# Patient Record
Sex: FEMALE | Race: WHITE | Employment: FULL TIME | URBAN - METROPOLITAN AREA
[De-identification: names, ages, dates, MRNs, and addresses within clinical notes are randomized per-mention and may not be internally consistent; named-entity substitution may affect disease eponyms.]

---

## 2017-01-04 ENCOUNTER — CLINICAL SUPPORT (OUTPATIENT)
Dept: CARDIOLOGY CLINIC | Age: 47
End: 2017-01-04

## 2017-01-04 DIAGNOSIS — Z79.01 LONG TERM (CURRENT) USE OF ANTICOAGULANTS: ICD-10-CM

## 2017-01-04 DIAGNOSIS — Z95.2 S/P AVR (AORTIC VALVE REPLACEMENT) AND AORTOPLASTY: Primary | ICD-10-CM

## 2017-01-06 ENCOUNTER — TELEPHONE (OUTPATIENT)
Dept: CARDIOLOGY CLINIC | Age: 47
End: 2017-01-06

## 2017-01-06 NOTE — TELEPHONE ENCOUNTER
IMAN for [facility] Records. Ms Warden Lara doesn't have [address] anymore. She has [employer]. Asked her to call me if she has any further questions.

## 2017-01-06 NOTE — TELEPHONE ENCOUNTER
The Nurse , Tamiko Pollock called from [place] [address], [phone]. She would like to discuss the patient's PTINR and medication intake. She would like to discuss the at home testing monitor. Thanks!

## 2017-01-12 ENCOUNTER — TELEPHONE (OUTPATIENT)
Dept: CARDIOLOGY CLINIC | Age: 47
End: 2017-01-12

## 2017-01-12 NOTE — TELEPHONE ENCOUNTER
Called patient. Verified patient's identity with two identifiers. Patient states she has felt some fatigue. She states it started after shoveling snow and while shoveling snow, she felt SOB. She states she thinks it could just be because she is out of shape. She denies current CP or sxs. She states her BP is 106/74 and HR 74. She asked if that was normal. I told her it was. I advised she call back if sxs continue/re-occur or worsen. Discussed signs and symptoms qualifying urgent care or call to office. Patient verbalizes understanding and denies further questions or concerns.

## 2017-01-13 ENCOUNTER — APPOINTMENT (OUTPATIENT)
Dept: GENERAL RADIOLOGY | Age: 47
DRG: 312 | End: 2017-01-13
Attending: EMERGENCY MEDICINE
Payer: COMMERCIAL

## 2017-01-13 ENCOUNTER — APPOINTMENT (OUTPATIENT)
Dept: CT IMAGING | Age: 47
DRG: 312 | End: 2017-01-13
Attending: EMERGENCY MEDICINE
Payer: COMMERCIAL

## 2017-01-13 ENCOUNTER — HOSPITAL ENCOUNTER (INPATIENT)
Age: 47
LOS: 3 days | Discharge: HOME | DRG: 312 | End: 2017-01-16
Attending: EMERGENCY MEDICINE | Admitting: FAMILY MEDICINE
Payer: COMMERCIAL

## 2017-01-13 DIAGNOSIS — R65.10 SIRS (SYSTEMIC INFLAMMATORY RESPONSE SYNDROME) (HCC): ICD-10-CM

## 2017-01-13 DIAGNOSIS — I95.89 OTHER SPECIFIED HYPOTENSION: ICD-10-CM

## 2017-01-13 DIAGNOSIS — R55 SYNCOPE AND COLLAPSE: Primary | ICD-10-CM

## 2017-01-13 PROBLEM — R41.82 ALTERED MENTAL STATUS: Status: ACTIVE | Noted: 2017-01-13

## 2017-01-13 LAB
ALBUMIN SERPL BCP-MCNC: 3.4 G/DL (ref 3.5–5)
ALBUMIN/GLOB SERPL: 0.9 {RATIO} (ref 1.1–2.2)
ALP SERPL-CCNC: 77 U/L (ref 45–117)
ALT SERPL-CCNC: 22 U/L (ref 12–78)
ANION GAP BLD CALC-SCNC: 8 MMOL/L (ref 5–15)
APPEARANCE UR: CLEAR
AST SERPL W P-5'-P-CCNC: 19 U/L (ref 15–37)
BACTERIA URNS QL MICRO: NEGATIVE /HPF
BASOPHILS # BLD AUTO: 0 K/UL (ref 0–0.1)
BASOPHILS # BLD: 0 % (ref 0–1)
BILIRUB SERPL-MCNC: 0.3 MG/DL (ref 0.2–1)
BILIRUB UR QL: NEGATIVE
BUN SERPL-MCNC: 12 MG/DL (ref 6–20)
BUN/CREAT SERPL: 14 (ref 12–20)
CALCIUM SERPL-MCNC: 8.1 MG/DL (ref 8.5–10.1)
CHLORIDE SERPL-SCNC: 101 MMOL/L (ref 97–108)
CK SERPL-CCNC: 71 U/L (ref 26–192)
CO2 SERPL-SCNC: 25 MMOL/L (ref 21–32)
COLOR UR: ABNORMAL
CREAT SERPL-MCNC: 0.85 MG/DL (ref 0.55–1.02)
EOSINOPHIL # BLD: 0.2 K/UL (ref 0–0.4)
EOSINOPHIL NFR BLD: 1 % (ref 0–7)
EPITH CASTS URNS QL MICRO: ABNORMAL /LPF
ERYTHROCYTE [DISTWIDTH] IN BLOOD BY AUTOMATED COUNT: 13.8 % (ref 11.5–14.5)
GLOBULIN SER CALC-MCNC: 3.7 G/DL (ref 2–4)
GLUCOSE SERPL-MCNC: 149 MG/DL (ref 65–100)
GLUCOSE UR STRIP.AUTO-MCNC: NEGATIVE MG/DL
HCT VFR BLD AUTO: 39.8 % (ref 35–47)
HGB BLD-MCNC: 13.6 G/DL (ref 11.5–16)
HGB UR QL STRIP: ABNORMAL
HYALINE CASTS URNS QL MICRO: ABNORMAL /LPF (ref 0–5)
INR PPP: 2.2 (ref 0.9–1.1)
KETONES UR QL STRIP.AUTO: NEGATIVE MG/DL
LACTATE SERPL-SCNC: 2.3 MMOL/L (ref 0.4–2)
LEUKOCYTE ESTERASE UR QL STRIP.AUTO: NEGATIVE
LYMPHOCYTES # BLD AUTO: 13 % (ref 12–49)
LYMPHOCYTES # BLD: 2.9 K/UL (ref 0.8–3.5)
MAGNESIUM SERPL-MCNC: 1.3 MG/DL (ref 1.6–2.4)
MCH RBC QN AUTO: 30.4 PG (ref 26–34)
MCHC RBC AUTO-ENTMCNC: 34.2 G/DL (ref 30–36.5)
MCV RBC AUTO: 88.8 FL (ref 80–99)
MONOCYTES # BLD: 1.2 K/UL (ref 0–1)
MONOCYTES NFR BLD AUTO: 6 % (ref 5–13)
NEUTS SEG # BLD: 17.7 K/UL (ref 1.8–8)
NEUTS SEG NFR BLD AUTO: 80 % (ref 32–75)
NITRITE UR QL STRIP.AUTO: NEGATIVE
PH UR STRIP: 5.5 [PH] (ref 5–8)
PLATELET # BLD AUTO: 261 K/UL (ref 150–400)
POTASSIUM SERPL-SCNC: 3.5 MMOL/L (ref 3.5–5.1)
PROT SERPL-MCNC: 7.1 G/DL (ref 6.4–8.2)
PROT UR STRIP-MCNC: NEGATIVE MG/DL
PROTHROMBIN TIME: 22.6 SEC (ref 9–11.1)
RBC # BLD AUTO: 4.48 M/UL (ref 3.8–5.2)
RBC #/AREA URNS HPF: ABNORMAL /HPF (ref 0–5)
SODIUM SERPL-SCNC: 134 MMOL/L (ref 136–145)
SP GR UR REFRACTOMETRY: 1.02 (ref 1–1.03)
TROPONIN I SERPL-MCNC: <0.04 NG/ML
UA: UC IF INDICATED,UAUC: ABNORMAL
UROBILINOGEN UR QL STRIP.AUTO: 1 EU/DL (ref 0.2–1)
WBC # BLD AUTO: 22 K/UL (ref 3.6–11)
WBC URNS QL MICRO: ABNORMAL /HPF (ref 0–4)

## 2017-01-13 PROCEDURE — 65660000000 HC RM CCU STEPDOWN

## 2017-01-13 PROCEDURE — 83605 ASSAY OF LACTIC ACID: CPT | Performed by: EMERGENCY MEDICINE

## 2017-01-13 PROCEDURE — 81001 URINALYSIS AUTO W/SCOPE: CPT | Performed by: EMERGENCY MEDICINE

## 2017-01-13 PROCEDURE — 93005 ELECTROCARDIOGRAM TRACING: CPT

## 2017-01-13 PROCEDURE — 71020 XR CHEST PA LAT: CPT

## 2017-01-13 PROCEDURE — 96361 HYDRATE IV INFUSION ADD-ON: CPT

## 2017-01-13 PROCEDURE — 85025 COMPLETE CBC W/AUTO DIFF WBC: CPT | Performed by: EMERGENCY MEDICINE

## 2017-01-13 PROCEDURE — 84484 ASSAY OF TROPONIN QUANT: CPT | Performed by: EMERGENCY MEDICINE

## 2017-01-13 PROCEDURE — 99285 EMERGENCY DEPT VISIT HI MDM: CPT

## 2017-01-13 PROCEDURE — 85610 PROTHROMBIN TIME: CPT | Performed by: EMERGENCY MEDICINE

## 2017-01-13 PROCEDURE — 80053 COMPREHEN METABOLIC PANEL: CPT | Performed by: EMERGENCY MEDICINE

## 2017-01-13 PROCEDURE — 36415 COLL VENOUS BLD VENIPUNCTURE: CPT | Performed by: EMERGENCY MEDICINE

## 2017-01-13 PROCEDURE — 94761 N-INVAS EAR/PLS OXIMETRY MLT: CPT

## 2017-01-13 PROCEDURE — 82550 ASSAY OF CK (CPK): CPT | Performed by: EMERGENCY MEDICINE

## 2017-01-13 PROCEDURE — 74011000258 HC RX REV CODE- 258: Performed by: EMERGENCY MEDICINE

## 2017-01-13 PROCEDURE — 96365 THER/PROPH/DIAG IV INF INIT: CPT

## 2017-01-13 PROCEDURE — 80307 DRUG TEST PRSMV CHEM ANLYZR: CPT | Performed by: FAMILY MEDICINE

## 2017-01-13 PROCEDURE — 96375 TX/PRO/DX INJ NEW DRUG ADDON: CPT

## 2017-01-13 PROCEDURE — 74011250636 HC RX REV CODE- 250/636: Performed by: EMERGENCY MEDICINE

## 2017-01-13 PROCEDURE — 70450 CT HEAD/BRAIN W/O DYE: CPT

## 2017-01-13 PROCEDURE — 83735 ASSAY OF MAGNESIUM: CPT | Performed by: EMERGENCY MEDICINE

## 2017-01-13 PROCEDURE — 87040 BLOOD CULTURE FOR BACTERIA: CPT | Performed by: EMERGENCY MEDICINE

## 2017-01-13 RX ORDER — WARFARIN 1 MG/1
3 TABLET ORAL
COMMUNITY
End: 2017-01-26

## 2017-01-13 RX ORDER — DOCUSATE SODIUM 100 MG/1
100 CAPSULE, LIQUID FILLED ORAL EVERY OTHER DAY
COMMUNITY
End: 2017-04-01

## 2017-01-13 RX ORDER — WARFARIN 1 MG/1
4 TABLET ORAL
COMMUNITY
End: 2017-03-22 | Stop reason: SDUPTHER

## 2017-01-13 RX ORDER — MAGNESIUM SULFATE HEPTAHYDRATE 40 MG/ML
2 INJECTION, SOLUTION INTRAVENOUS
Status: COMPLETED | OUTPATIENT
Start: 2017-01-13 | End: 2017-01-13

## 2017-01-13 RX ORDER — THERA TABS 400 MCG
1 TAB ORAL DAILY
COMMUNITY
End: 2017-11-14

## 2017-01-13 RX ORDER — VANCOMYCIN 1.75 GRAM/500 ML IN 0.9 % SODIUM CHLORIDE INTRAVENOUS
25 ONCE
Status: COMPLETED | OUTPATIENT
Start: 2017-01-13 | End: 2017-01-14

## 2017-01-13 RX ADMIN — PIPERACILLIN SODIUM,TAZOBACTAM SODIUM 3.38 G: 3; .375 INJECTION, POWDER, FOR SOLUTION INTRAVENOUS at 22:06

## 2017-01-13 RX ADMIN — SODIUM CHLORIDE 1000 ML: 900 INJECTION, SOLUTION INTRAVENOUS at 20:23

## 2017-01-13 RX ADMIN — SODIUM CHLORIDE 1000 ML: 900 INJECTION, SOLUTION INTRAVENOUS at 21:34

## 2017-01-13 RX ADMIN — MAGNESIUM SULFATE HEPTAHYDRATE 2 G: 40 INJECTION, SOLUTION INTRAVENOUS at 22:53

## 2017-01-13 RX ADMIN — VANCOMYCIN HYDROCHLORIDE 1750 MG: 10 INJECTION, POWDER, LYOPHILIZED, FOR SOLUTION INTRAVENOUS at 23:49

## 2017-01-13 NOTE — IP AVS SNAPSHOT
[address] 
[phone] Patient: Misti Montalvo MRN: [id number] REP:1/2/4588 You are allergic to the following Allergen Reactions Contrast Dye (Iodine) Anaphylaxis Lipitor (Atorvastatin) Myalgia And GI upset Percocet (Oxycodone-Acetaminophen) Other (comments) Dizziness/fall Sulfa (Sulfonamide Antibiotics) Other (comments) Gi upset Tramadol Rash Vicoprofen (Hydrocodone-Ibuprofen) Nausea and Vomiting Can take hydrocodone and ibuprofen separately, but not together Immunizations Administered for This Admission Name Date Influenza Vaccine (Quad) PF 1/16/2017 Recent Documentation Height Weight BMI OB Status Smoking Status 1.613 m 72.8 kg 27.98 kg/m2 Hysterectomy Current Every Day Smoker Unresulted Labs Order Current Status CULTURE, BLOOD, PAIRED Preliminary result Emergency Contacts Name Discharge Info Relation Home Work Mobile [address] CAREGIVER [3] Parent [1] [phone] About your hospitalization You were admitted on:  January 13, 2017 You last received care in the:  [facility] CV SERVICES UNIT You were discharged on:  January 16, 2017 Unit phone number:  [phone] Why you were hospitalized Your primary diagnosis was: Altered Mental Status Your diagnoses also included:  Syncope, Sirs (Systemic Inflammatory Response Syndrome) (Hcc) Providers Seen During Your Hospitalizations Provider Role Specialty Primary office phone Janay Mittla MD Attending Provider Emergency Medicine [phone] Myla Monique MD Attending Provider [facility] [phone] Alfred March MD Attending Provider Internal Medicine [phone] Hanna Holly MD Attending Provider Internal Medicine [phone] Your Primary Care Physician (PCP) Primary Care Physician Office Phone Office Fax Nicci Martinez [phone] ** None ** Follow-up Information Follow up With Details Comments Contact Info [facility] [address], MD In 1 week  [address] 
[phone] Raimundo Edward MD On 1/23/2017 11:00 AM [address] [place] 
[phone] Hanna Segovia MD On 2/2/2017 9:00 AM [address] [place] 
[phone] Your Appointments Monday January 23, 2017 11:00 AM EST HOSPITAL DISCHARGE with Raimundo Edward MD  
[facility] ([facility]) [address] [place]  
[phone] Thursday February 02, 2017  9:00 AM EST New Patient with Hanna Segovia MD  
[facility] ([facility]) [address] [place]  
[phone] Friday February 03, 2017  3:40 PM EST  
COUMADIN CLINIC with EFREM JULIEN [facility] ([facility] SCHEDULING) [address] [place]  
[phone] Thursday March 02, 2017  8:40 AM EST  
ESTABLISHED PATIENT with Raimundo Edward MD  
[facility] ([facility]) [address] [place]  
[phone] Current Discharge Medication List  
  
CONTINUE these medications which have CHANGED Dose & Instructions Dispensing Information Comments Morning Noon Evening Bedtime * warfarin 1 mg tablet Commonly known as:  COUMADIN What changed:  Another medication with the same name was removed. Continue taking this medication, and follow the directions you see here. Your next dose is: Today, Tomorrow Other:  _________ Dose:  4 mg Take 4 mg by mouth five (5) days a week. Every day except Wednesday and Saturday Refills:  0  
     
   
   
   
  
 * warfarin 1 mg tablet Commonly known as:  COUMADIN What changed:  Another medication with the same name was removed. Continue taking this medication, and follow the directions you see here. Your next dose is: Today, Tomorrow Other:  _________ Dose:  3 mg Take 3 mg by mouth two (2) days a week. On Wednesday and Saturday Refills:  0  
     
   
   
   
  
 * Notice: This list has 2 medication(s) that are the same as other medications prescribed for you. Read the directions carefully, and ask your doctor or other care provider to review them with you. CONTINUE these medications which have NOT CHANGED Dose & Instructions Dispensing Information Comments Morning Noon Evening Bedtime  
 aspirin 81 mg chewable tablet Your next dose is: Today, Tomorrow Other:  _________ Dose:  81 mg Take 1 Tab by mouth daily. Quantity:  30 Tab Refills:  1  
     
   
   
   
  
 docusate sodium 100 mg capsule Commonly known as:  Feliciano Pata Your next dose is: Today, Tomorrow Other:  _________ Dose:  100 mg Take 100 mg by mouth every other day. Refills:  0  
     
   
   
   
  
 eletriptan 40 mg tablet Commonly known as:  RELPAX Your next dose is: Today, Tomorrow Other:  _________ Dose:  40 mg Take 1 Tab by mouth once as needed for up to 1 dose. may repeat in 2 hours if necessary Quantity:  6 Tab Refills:  3 FLUoxetine 40 mg capsule Commonly known as:  PROzac Your next dose is: Today, Tomorrow Other:  _________ TAKE ONE CAPSULE BY MOUTH DAILY Quantity:  30 Cap Refills:  1 MIRALAX 17 gram/dose powder Generic drug:  polyethylene glycol Your next dose is: Today, Tomorrow Other:  _________ Dose:  17 g Take 17 g by mouth as needed (for constipation). Refills:  0 pravastatin 40 mg tablet Commonly known as:  PRAVACHOL Your next dose is: Today, Tomorrow Other:  _________ Dose:  40 mg Take 40 mg by mouth nightly. Refills:  0  
     
   
   
   
  
 therapeutic multivitamin tablet Commonly known as:  [facility] Your next dose is: Today, Tomorrow Other:  _________ Dose:  1 Tab Take 1 Tab by mouth daily. Refills:  0 ZyrTEC 10 mg tablet Generic drug:  cetirizine Your next dose is: Today, Tomorrow Other:  _________ Dose:  10 mg Take 10 mg by mouth as needed. Refills:  0 STOP taking these medications MULTIVITAMIN PO Discharge Instructions Discharge Instructions PATIENT ID: Araceli Brennan MRN: [id number] YOB: 1970 DATE OF ADMISSION: 1/13/2017  8:05 PM   
DATE OF DISCHARGE: 1/16/2017 PRIMARY CARE PROVIDER: Opal Simpson MD  
 
ATTENDING PHYSICIAN: Paul Gonzales MD 
DISCHARGING PROVIDER: Paul Gonzales MD   
To contact this individual call [phone] and ask the  to page. If unavailable ask to be transferred the Adult Hospitalist Department. DISCHARGE DIAGNOSES Syncope CONSULTATIONS: IP CONSULT TO HOSPITALIST 
IP CONSULT TO CARDIOLOGY 
IP CONSULT TO INFECTIOUS DISEASES PROCEDURES/SURGERIES: * No surgery found * PENDING TEST RESULTS:  
At the time of discharge the following test results are still pending: FOLLOW UP APPOINTMENTS:  
Follow-up Information Follow up With Details Comments Contact Info Opal Simpson MD In 1 week  [address] 
[phone] Nabor Cohen MD In 2 weeks  [place] [address] 
[phone] ADDITIONAL CARE RECOMMENDATIONS: follow up with cardiology for further w/u DIET: Cardiac Diet ACTIVITY: Activity as tolerated WOUND CARE:  
 
EQUIPMENT needed:  
 
 
DISCHARGE MEDICATIONS: 
 See Medication Reconciliation Form · It is important that you take the medication exactly as they are prescribed. · Keep your medication in the bottles provided by the pharmacist and keep a list of the medication names, dosages, and times to be taken in your wallet. · Do not take other medications without consulting your doctor. NOTIFY YOUR PHYSICIAN FOR ANY OF THE FOLLOWING:  
Fever over 101 degrees for 24 hours. Chest pain, shortness of breath, fever, chills, nausea, vomiting, diarrhea, change in mentation, falling, weakness, bleeding. Severe pain or pain not relieved by medications. Or, any other signs or symptoms that you may have questions about. DISPOSITION: 
x  Home With: 
 OT  PT  [facility]  RN  
  
 SNF/Inpatient Rehab/LTAC Independent/assisted living Hospice Other: CDMP Checked:  
Yes x PROBLEM LIST Updated: 
Yes x Signed:  
Robert Stafford MD 
1/16/2017 
12:10 PM 
 
Discharge Orders None [place] Announcement We are excited to announce that we are making your provider's discharge notes available to you in Rico. You will see these notes when they are completed and signed by the physician that discharged you from your recent hospital stay. If you have any questions or concerns about any information you see in Rico, please call the Health Information Department where you were seen or reach out to your Primary Care Provider for more information about your plan of care. Introducing \[facility]\"" & HEALTH SERVICES! Dear Antonia Davidson: 
Thank you for requesting a Rico account. Our records indicate that you have previously registered for a Rico account but its currently inactive. Please call our [place] support line at [phone]. Additional Information If you have questions, please visit the Frequently Asked Questions section of the [employer] website at [email or web address] Remember, MyChart is NOT to be used for urgent needs. For medical emergencies, dial 911. Now available from your iPhone and Android! General Information Please provide this summary of care documentation to your next provider. Patient Signature:  ____________________________________________________________ Date:  ____________________________________________________________  
  
[facility] Provider Signature:  ____________________________________________________________ Date:  ____________________________________________________________

## 2017-01-14 LAB
AMMONIA PLAS-SCNC: 28 UMOL/L
AMPHET UR QL SCN: NEGATIVE
ANION GAP BLD CALC-SCNC: 6 MMOL/L (ref 5–15)
APAP SERPL-MCNC: <2 UG/ML (ref 10–30)
APTT PPP: 37.9 SEC (ref 22.1–32.5)
ATRIAL RATE: 80 BPM
BARBITURATES UR QL SCN: NEGATIVE
BASOPHILS # BLD AUTO: 0 K/UL (ref 0–0.1)
BASOPHILS # BLD: 0 % (ref 0–1)
BENZODIAZ UR QL: NEGATIVE
BUN SERPL-MCNC: 9 MG/DL (ref 6–20)
BUN/CREAT SERPL: 16 (ref 12–20)
CALCIUM SERPL-MCNC: 7.5 MG/DL (ref 8.5–10.1)
CALCULATED P AXIS, ECG09: 44 DEGREES
CALCULATED R AXIS, ECG10: 61 DEGREES
CALCULATED T AXIS, ECG11: 81 DEGREES
CANNABINOIDS UR QL SCN: POSITIVE
CHLORIDE SERPL-SCNC: 114 MMOL/L (ref 97–108)
CO2 SERPL-SCNC: 22 MMOL/L (ref 21–32)
COCAINE UR QL SCN: NEGATIVE
CREAT SERPL-MCNC: 0.56 MG/DL (ref 0.55–1.02)
DIAGNOSIS, 93000: NORMAL
DRUG SCRN COMMENT,DRGCM: ABNORMAL
EOSINOPHIL # BLD: 0.2 K/UL (ref 0–0.4)
EOSINOPHIL NFR BLD: 1 % (ref 0–7)
ERYTHROCYTE [DISTWIDTH] IN BLOOD BY AUTOMATED COUNT: 13.9 % (ref 11.5–14.5)
ETHANOL SERPL-MCNC: <10 MG/DL
GLUCOSE SERPL-MCNC: 94 MG/DL (ref 65–100)
HCT VFR BLD AUTO: 34.3 % (ref 35–47)
HGB BLD-MCNC: 11.6 G/DL (ref 11.5–16)
INR PPP: 2 (ref 0.9–1.1)
LACTATE SERPL-SCNC: 0.9 MMOL/L (ref 0.4–2)
LYMPHOCYTES # BLD AUTO: 25 % (ref 12–49)
LYMPHOCYTES # BLD: 3.6 K/UL (ref 0.8–3.5)
MAGNESIUM SERPL-MCNC: 2.1 MG/DL (ref 1.6–2.4)
MCH RBC QN AUTO: 30 PG (ref 26–34)
MCHC RBC AUTO-ENTMCNC: 33.8 G/DL (ref 30–36.5)
MCV RBC AUTO: 88.6 FL (ref 80–99)
METHADONE UR QL: NEGATIVE
MONOCYTES # BLD: 0.6 K/UL (ref 0–1)
MONOCYTES NFR BLD AUTO: 5 % (ref 5–13)
NEUTS SEG # BLD: 9.8 K/UL (ref 1.8–8)
NEUTS SEG NFR BLD AUTO: 69 % (ref 32–75)
OPIATES UR QL: NEGATIVE
P-R INTERVAL, ECG05: 146 MS
PCP UR QL: NEGATIVE
PHOSPHATE SERPL-MCNC: 3.1 MG/DL (ref 2.6–4.7)
PLATELET # BLD AUTO: 229 K/UL (ref 150–400)
POTASSIUM SERPL-SCNC: 4.2 MMOL/L (ref 3.5–5.1)
PROTHROMBIN TIME: 20.3 SEC (ref 9–11.1)
Q-T INTERVAL, ECG07: 482 MS
QRS DURATION, ECG06: 74 MS
QTC CALCULATION (BEZET), ECG08: 555 MS
RBC # BLD AUTO: 3.87 M/UL (ref 3.8–5.2)
SALICYLATES SERPL-MCNC: 3 MG/DL (ref 2.8–20)
SODIUM SERPL-SCNC: 142 MMOL/L (ref 136–145)
T4 FREE SERPL-MCNC: 1 NG/DL (ref 0.8–1.5)
THERAPEUTIC RANGE,PTTT: ABNORMAL SECS (ref 58–77)
TROPONIN I SERPL-MCNC: <0.04 NG/ML
TSH SERPL DL<=0.05 MIU/L-ACNC: 0.48 UIU/ML (ref 0.36–3.74)
VENTRICULAR RATE, ECG03: 80 BPM
WBC # BLD AUTO: 14.2 K/UL (ref 3.6–11)

## 2017-01-14 PROCEDURE — 84484 ASSAY OF TROPONIN QUANT: CPT | Performed by: FAMILY MEDICINE

## 2017-01-14 PROCEDURE — 84439 ASSAY OF FREE THYROXINE: CPT | Performed by: FAMILY MEDICINE

## 2017-01-14 PROCEDURE — 85730 THROMBOPLASTIN TIME PARTIAL: CPT | Performed by: FAMILY MEDICINE

## 2017-01-14 PROCEDURE — 80048 BASIC METABOLIC PNL TOTAL CA: CPT | Performed by: FAMILY MEDICINE

## 2017-01-14 PROCEDURE — 65660000000 HC RM CCU STEPDOWN

## 2017-01-14 PROCEDURE — 83735 ASSAY OF MAGNESIUM: CPT | Performed by: FAMILY MEDICINE

## 2017-01-14 PROCEDURE — 85610 PROTHROMBIN TIME: CPT | Performed by: FAMILY MEDICINE

## 2017-01-14 PROCEDURE — 74011250636 HC RX REV CODE- 250/636: Performed by: INTERNAL MEDICINE

## 2017-01-14 PROCEDURE — 80307 DRUG TEST PRSMV CHEM ANLYZR: CPT | Performed by: FAMILY MEDICINE

## 2017-01-14 PROCEDURE — 93306 TTE W/DOPPLER COMPLETE: CPT

## 2017-01-14 PROCEDURE — 97161 PT EVAL LOW COMPLEX 20 MIN: CPT

## 2017-01-14 PROCEDURE — 74011250636 HC RX REV CODE- 250/636: Performed by: FAMILY MEDICINE

## 2017-01-14 PROCEDURE — 74011000258 HC RX REV CODE- 258: Performed by: FAMILY MEDICINE

## 2017-01-14 PROCEDURE — 84443 ASSAY THYROID STIM HORMONE: CPT | Performed by: FAMILY MEDICINE

## 2017-01-14 PROCEDURE — 85025 COMPLETE CBC W/AUTO DIFF WBC: CPT | Performed by: FAMILY MEDICINE

## 2017-01-14 PROCEDURE — 74011000258 HC RX REV CODE- 258: Performed by: INTERNAL MEDICINE

## 2017-01-14 PROCEDURE — 84100 ASSAY OF PHOSPHORUS: CPT | Performed by: FAMILY MEDICINE

## 2017-01-14 PROCEDURE — 97116 GAIT TRAINING THERAPY: CPT

## 2017-01-14 PROCEDURE — 36415 COLL VENOUS BLD VENIPUNCTURE: CPT | Performed by: FAMILY MEDICINE

## 2017-01-14 PROCEDURE — 83605 ASSAY OF LACTIC ACID: CPT | Performed by: FAMILY MEDICINE

## 2017-01-14 PROCEDURE — 74011250637 HC RX REV CODE- 250/637: Performed by: FAMILY MEDICINE

## 2017-01-14 PROCEDURE — 82140 ASSAY OF AMMONIA: CPT | Performed by: FAMILY MEDICINE

## 2017-01-14 RX ORDER — ACETAMINOPHEN 325 MG/1
650 TABLET ORAL
Status: DISCONTINUED | OUTPATIENT
Start: 2017-01-14 | End: 2017-01-16 | Stop reason: HOSPADM

## 2017-01-14 RX ORDER — POLYETHYLENE GLYCOL 3350 17 G/17G
17 POWDER, FOR SOLUTION ORAL DAILY PRN
Status: DISCONTINUED | OUTPATIENT
Start: 2017-01-14 | End: 2017-01-16 | Stop reason: HOSPADM

## 2017-01-14 RX ORDER — WARFARIN SODIUM 5 MG/1
5 TABLET ORAL ONCE
Status: COMPLETED | OUTPATIENT
Start: 2017-01-14 | End: 2017-01-14

## 2017-01-14 RX ORDER — SODIUM CHLORIDE 0.9 % (FLUSH) 0.9 %
5-10 SYRINGE (ML) INJECTION EVERY 8 HOURS
Status: DISCONTINUED | OUTPATIENT
Start: 2017-01-14 | End: 2017-01-16 | Stop reason: HOSPADM

## 2017-01-14 RX ORDER — SODIUM CHLORIDE 0.9 % (FLUSH) 0.9 %
5-10 SYRINGE (ML) INJECTION AS NEEDED
Status: DISCONTINUED | OUTPATIENT
Start: 2017-01-14 | End: 2017-01-16 | Stop reason: HOSPADM

## 2017-01-14 RX ORDER — GUAIFENESIN 100 MG/5ML
81 LIQUID (ML) ORAL DAILY
Status: DISCONTINUED | OUTPATIENT
Start: 2017-01-14 | End: 2017-01-16 | Stop reason: HOSPADM

## 2017-01-14 RX ORDER — FLUOXETINE HYDROCHLORIDE 20 MG/1
40 CAPSULE ORAL DAILY
Status: DISCONTINUED | OUTPATIENT
Start: 2017-01-14 | End: 2017-01-16 | Stop reason: HOSPADM

## 2017-01-14 RX ORDER — DOCUSATE SODIUM 100 MG/1
100 CAPSULE, LIQUID FILLED ORAL EVERY OTHER DAY
Status: DISCONTINUED | OUTPATIENT
Start: 2017-01-14 | End: 2017-01-16 | Stop reason: HOSPADM

## 2017-01-14 RX ORDER — SODIUM CHLORIDE 9 MG/ML
150 INJECTION, SOLUTION INTRAVENOUS CONTINUOUS
Status: DISCONTINUED | OUTPATIENT
Start: 2017-01-14 | End: 2017-01-15

## 2017-01-14 RX ORDER — THERA TABS 400 MCG
1 TAB ORAL DAILY
Status: DISCONTINUED | OUTPATIENT
Start: 2017-01-14 | End: 2017-01-16 | Stop reason: HOSPADM

## 2017-01-14 RX ORDER — PRAVASTATIN SODIUM 40 MG/1
40 TABLET ORAL
Status: DISCONTINUED | OUTPATIENT
Start: 2017-01-14 | End: 2017-01-16 | Stop reason: HOSPADM

## 2017-01-14 RX ORDER — CETIRIZINE HCL 10 MG
10 TABLET ORAL DAILY
Status: DISCONTINUED | OUTPATIENT
Start: 2017-01-14 | End: 2017-01-16 | Stop reason: HOSPADM

## 2017-01-14 RX ADMIN — VANCOMYCIN HYDROCHLORIDE 1000 MG: 1 INJECTION, POWDER, LYOPHILIZED, FOR SOLUTION INTRAVENOUS at 21:24

## 2017-01-14 RX ADMIN — Medication 10 ML: at 01:00

## 2017-01-14 RX ADMIN — ACETAMINOPHEN 650 MG: 325 TABLET, FILM COATED ORAL at 02:00

## 2017-01-14 RX ADMIN — FLUOXETINE 40 MG: 20 CAPSULE ORAL at 08:52

## 2017-01-14 RX ADMIN — WARFARIN SODIUM 5 MG: 5 TABLET ORAL at 18:33

## 2017-01-14 RX ADMIN — VANCOMYCIN HYDROCHLORIDE 1000 MG: 1 INJECTION, POWDER, LYOPHILIZED, FOR SOLUTION INTRAVENOUS at 12:15

## 2017-01-14 RX ADMIN — THERA TABS 1 TABLET: TAB at 08:53

## 2017-01-14 RX ADMIN — PRAVASTATIN SODIUM 40 MG: 40 TABLET ORAL at 02:00

## 2017-01-14 RX ADMIN — Medication 10 ML: at 21:24

## 2017-01-14 RX ADMIN — Medication 10 ML: at 14:00

## 2017-01-14 RX ADMIN — ASPIRIN 81 MG 81 MG: 81 TABLET ORAL at 08:53

## 2017-01-14 RX ADMIN — PIPERACILLIN SODIUM,TAZOBACTAM SODIUM 3.38 G: 3; .375 INJECTION, POWDER, FOR SOLUTION INTRAVENOUS at 15:32

## 2017-01-14 RX ADMIN — CETIRIZINE HYDROCHLORIDE 10 MG: 10 TABLET, FILM COATED ORAL at 08:52

## 2017-01-14 RX ADMIN — PIPERACILLIN SODIUM,TAZOBACTAM SODIUM 3.38 G: 3; .375 INJECTION, POWDER, FOR SOLUTION INTRAVENOUS at 06:59

## 2017-01-14 RX ADMIN — SODIUM CHLORIDE 150 ML/HR: 900 INJECTION, SOLUTION INTRAVENOUS at 02:20

## 2017-01-14 RX ADMIN — PRAVASTATIN SODIUM 40 MG: 40 TABLET ORAL at 21:24

## 2017-01-14 RX ADMIN — WARFARIN SODIUM 5 MG: 5 TABLET ORAL at 02:00

## 2017-01-14 RX ADMIN — Medication 10 ML: at 06:00

## 2017-01-14 NOTE — PROGRESS NOTES
Pharmacist Note  Warfarin Dosing  Consult provided for this 52 y. o.female to manage warfarin s/p mechanical aortic valve replacement in Aug '16    INR Goal: 2.5- 3.5 (confirmed with office notes)    Home regimen/ tablet size: 3 mg on Sun/Wed and 4 mg on M/T/Th/F/Sat    Drugs that may increase INR: None  Drugs that may decrease INR: None  Other current anticoagulants/ drugs that may increase bleeding risk: Aspirin, fluoxetine  Risk factors: None  Daily INR ordered: YES    Recent Labs      01/14/17   0419  01/13/17 2024   HGB  11.6  13.6   INR  2.0*  2.2*     Date               INR                  Dose  1/13  2.2  5 mg  1/14                 2                      5 mg                                                                                Assessment/ Plan: Will order warfarin 5 mg PO x 1 dose. May need to consider bridging with Lovenox until INR is therapeutic (goal 2.5 to 3.5 for mechanical aortic valve). Pharmacy will continue to monitor daily and adjust therapy as indicated. Please contact the pharmacist at  for outpatient recommendations if needed.

## 2017-01-14 NOTE — PROGRESS NOTES
1137: TRANSFER - IN REPORT:    Verbal report received from [address] (name) on Saranya Bourne  being received from ED (unit) for routine progression of care      Report consisted of patients Situation, Background, Assessment and   Recommendations(SBAR). Information from the following report(s) SBAR was reviewed with the receiving nurse. Opportunity for questions and clarification was provided. Assessment completed upon patients arrival to unit and care assumed. 0000: Bedside and Verbal shift change report given to [facility] [place] (oncoming nurse) by Charly Richmond (offgoing nurse). Report included the following information SBAR.

## 2017-01-14 NOTE — PROGRESS NOTES
Hospitalist Progress Note  Sumaya Zheng MD  Office: [phone]      Date of Service:  2017  NAME:  Blake Geiger  :  1970  MRN:  [id number]      Admission Summary:   Blake Geiger is a 52 y.o. white female with past medical history of anxiety, depression, ADD, GERD, hearing loss, hiatal hernia, hyperlipidemia, IBS, migraine headaches, mitral valve prolapse, neurotic eczema, pelvic pain, post menopausal syndrome, PTSD, TMJ, s/p CABG, s/p AVR presented to the ED via EMS from home with reported syncope. Patient reportedly had unwitnessed syncope event today. Patient reportedly was standing in the the kitchen, became suddenly dizzy, lightheaded, lost consciousness, and collapsed to the floor. Patient initially was uncertain if she had had hit her head per ED note. Patient notedly was confused after the syncopal episode. Interval history / Subjective:     Mental status better      Assessment & Plan:     1. Altered mental status. Admit to telemetry. Place on neurovascular checks, fall precautions. 2. Syncope. Plan as above. Order 2d echocardiogram, orthostatic vital signs. 3. SIRS (systemic inflammatory response syndrome). Rule out sepsis. - blood cx pen   - chest xray , ua all clear   - cont abx  - wbc trending down     6. MVP :  Long term anticoagulation on Warfarin. Pharmacy to dose Warfarin. 8. Tobacco abuse. Encourage smoking cessation. 9. Depression. On Prozac. Resume home medication. 10. Hyperlipidemia.  Continue Pravastatin.       Code status: Full   DVT prophylaxis: on coumadin     Care Plan discussed with: Patient/Family and Nurse  Disposition: TBD     Hospital Problems  Date Reviewed: 2016          Codes Class Noted POA    * (Principal)Altered mental status ICD-10-CM: R41.82  ICD-9-CM: 780.97  2017 Unknown        SIRS (systemic inflammatory response syndrome) (HCC) ICD-10-CM: R65.10  ICD-9-CM: 995.90  1/13/2017 Unknown        Syncope ICD-10-CM: R55  ICD-9-CM: 780.2  7/1/2016 Unknown                Review of Systems:   A comprehensive review of systems was negative except for that written in the HPI. Vital Signs:    Last 24hrs VS reviewed since prior progress note. Most recent are:  Visit Vitals    /48 (BP 1 Location: Right arm, BP Patient Position: At rest)    Pulse (!) 58    Temp 97.8 °F (36.6 °C)    Resp 18    Ht 5' 3.5\" (1.613 m)    Wt 70.5 kg (155 lb 6.8 oz)    SpO2 99%    BMI 27.1 kg/m2         Intake/Output Summary (Last 24 hours) at 01/14/17 0950  Last data filed at 01/14/17 8551   Gross per 24 hour   Intake              100 ml   Output             1000 ml   Net             -900 ml        Physical Examination:             Constitutional:  No acute distress, cooperative, pleasant    ENT:  Oral mucous moist, oropharynx benign. Neck supple,    Resp:  CTA bilaterally. No wheezing/rhonchi/rales. No accessory muscle use   CV:  Regular rhythm, normal rate, no murmurs, gallops, rubs    GI:  Soft, non distended, non tender. normoactive bowel sounds, no hepatosplenomegaly     Musculoskeletal:  No edema, warm, 2+ pulses throughout    Neurologic:  Moves all extremities.   AAOx3, CN II-XII reviewed          Data Review:    Review and/or order of clinical lab test  Review and/or order of tests in the radiology section of CPT  Review and/or order of tests in the medicine section of CPT      Labs:     Recent Labs      01/14/17 0419 01/13/17 2024   WBC  14.2*  22.0*   HGB  11.6  13.6   HCT  34.3*  39.8   PLT  229  261     Recent Labs      01/14/17 0419 01/13/17 2024   NA  142  134*   K  4.2  3.5   CL  114*  101   CO2  22  25   BUN  9  12   CREA  0.56  0.85   GLU  94  149*   CA  7.5*  8.1*   MG  2.1  1.3*   PHOS  3.1   --      Recent Labs      01/13/17 2024   SGOT  19   ALT  22   AP  77   TBILI  0.3   TP  7.1   ALB  3.4*   GLOB  3.7     Recent Labs      01/14/17 0419 01/13/17   2024 INR  2.0*  2.2*   PTP  20.3*  22.6*   APTT  37.9*   --       No results for input(s): FE, TIBC, PSAT, FERR in the last 72 hours. Lab Results   Component Value Date/Time    Folate 13.4 04/16/2010 09:48 AM      No results for input(s): PH, PCO2, PO2 in the last 72 hours.   Recent Labs      01/14/17   0419  01/13/17 2024   TROIQ  <0.04  <0.04     Lab Results   Component Value Date/Time    Cholesterol, total 204 07/26/2016 12:29 PM    HDL Cholesterol 50 07/26/2016 12:29 PM    LDL, calculated 141 07/26/2016 12:29 PM    Triglyceride 64 07/26/2016 12:29 PM     Lab Results   Component Value Date/Time    Glucose (POC) 95 08/20/2016 06:32 AM    Glucose (POC) 113 08/19/2016 05:33 PM    Glucose (POC) 122 08/19/2016 01:09 PM    Glucose (POC) 115 08/19/2016 12:06 PM    Glucose (POC) 114 08/19/2016 11:02 AM     Lab Results   Component Value Date/Time    Color YELLOW/STRAW 01/13/2017 10:45 PM    Appearance CLEAR 01/13/2017 10:45 PM    Specific gravity 1.016 01/13/2017 10:45 PM    pH (UA) 5.5 01/13/2017 10:45 PM    Protein NEGATIVE  01/13/2017 10:45 PM    Glucose NEGATIVE  01/13/2017 10:45 PM    Ketone NEGATIVE  01/13/2017 10:45 PM    Bilirubin NEGATIVE  01/13/2017 10:45 PM    Urobilinogen 1.0 01/13/2017 10:45 PM    Nitrites NEGATIVE  01/13/2017 10:45 PM    Leukocyte Esterase NEGATIVE  01/13/2017 10:45 PM    Epithelial cells FEW 01/13/2017 10:45 PM    Bacteria NEGATIVE  01/13/2017 10:45 PM    WBC 0-4 01/13/2017 10:45 PM    RBC 0-5 01/13/2017 10:45 PM         Medications Reviewed:     Current Facility-Administered Medications   Medication Dose Route Frequency    sodium chloride (NS) flush 5-10 mL  5-10 mL IntraVENous Q8H    sodium chloride (NS) flush 5-10 mL  5-10 mL IntraVENous PRN    aspirin chewable tablet 81 mg  81 mg Oral DAILY    cetirizine (ZYRTEC) tablet 10 mg  10 mg Oral DAILY    docusate sodium (COLACE) capsule 100 mg  100 mg Oral EVERY OTHER DAY    FLUoxetine (PROzac) capsule 40 mg  40 mg Oral DAILY    polyethylene glycol (MIRALAX) packet 17 g  17 g Oral DAILY PRN    pravastatin (PRAVACHOL) tablet 40 mg  40 mg Oral QHS    therapeutic multivitamin (THERAGRAN) tablet 1 Tab  1 Tab Oral DAILY    influenza vaccine 2016-17 (36mos+)(PF) (FLUZONE/FLUARIX/FLULAVAL QUAD) injection 0.5 mL  0.5 mL IntraMUSCular PRIOR TO DISCHARGE    acetaminophen (TYLENOL) tablet 650 mg  650 mg Oral Q6H PRN    0.9% sodium chloride infusion  150 mL/hr IntraVENous CONTINUOUS    vancomycin (VANCOCIN) 1,000 mg in 0.9% sodium chloride (MBP/ADV) 250 mL  1 g IntraVENous Q12H    VANCOMYCIN INFORMATION NOTE   Other PRN    WARFARIN INFORMATION NOTE (COUMADIN)   Other PRN    piperacillin-tazobactam (ZOSYN) 3.375 g in 0.9% sodium chloride (MBP/ADV) 100 mL  3.375 g IntraVENous Q8H    warfarin (COUMADIN) tablet 5 mg  5 mg Oral ONCE     ______________________________________________________________________  EXPECTED LENGTH OF STAY: - - -  ACTUAL LENGTH OF STAY:          1                 Marguerite Angelo MD

## 2017-01-14 NOTE — PROGRESS NOTES
Day #1 of Vancomycin  Indication:  SIRS, R/O sepsis  Current regimen:  Rx to Dose  Abx regimen:  Zosyn + Vancomycin  ID Following ?: NO  Concomitant nephrotoxic drugs (requires more frequent monitoring): None  Frequency of BMP?: Daily    Recent Labs      17   0419  17   WBC  14.2*  22.0*   CREA  0.56  0.85   BUN  9  12     Est CrCl: >100 ml/min  Temp (24hrs), Av.4 °F (36.9 °C), Min:97.8 °F (36.6 °C), Max:99.2 °F (37.3 °C)    Cultures:    Blood: NGTD    Goal trough = 15 - 20 mcg/mL    Recent trough history (date/time/level/dose/action taken):  None    Plan: The patient was given a vancomycin loading dose of 1750 mg IV x 1 on  @ 2349 with a maintenance dose of 1 gm IV Q 12 hr ordered. This was discontinued this morning and then subsequently re-ordered a few minutes later (I verified with Dr. Manpreet Feldman that she did want to restart it). Upon re-evaluation of the patient, it is noted that her Scr is now 0.56 (down from 0.85 yesterday evening). Therefore, I will restart the vancomycin at a dose of 1 gm IV Q 8 hr. Pharmacy will continue to monitor patient daily and will make dosage adjustments based upon changing renal function.

## 2017-01-14 NOTE — PROGRESS NOTES
0800 Bedside shift change report given to Jaymie Lou RN (oncoming nurse) by Sudha Holder RN (offgoing nurse). Report included the following information SBAR, Kardex, ED Summary, Procedure Summary, Intake/Output, MAR, Recent Results, Med Rec Status and Cardiac Rhythm Bradycardia. Problem: Falls - Risk of  Goal: *Absence of falls  Outcome: Progressing Towards Goal  Patient is free from falls. Problem: Pressure Ulcer - Risk of  Goal: *Prevention of pressure ulcer  Outcome: Progressing Towards Goal  Patient moves freely side to side. Problem: Syncope  Goal: Decrease or eliminate episodes of syncope  Outcome: Progressing Towards Goal  Patient denies any dizziness.
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
## 2017-01-14 NOTE — ED NOTES
TRANSFER - OUT REPORT:    Verbal report given to Nancy García RN on [employer]  being transferred to [facility] (unit) for routine progression of care       Report consisted of patients Situation, Background, Assessment and   Recommendations(SBAR). Information from the following report(s) ED Summary was reviewed with the receiving nurse. Lines:   Peripheral IV 01/13/17 Left Antecubital (Active)   Site Assessment Clean, dry, & intact 1/13/2017  8:22 PM   Phlebitis Assessment 0 1/13/2017  8:22 PM   Infiltration Assessment 0 1/13/2017  8:22 PM   Dressing Status Clean, dry, & intact 1/13/2017  8:22 PM   Dressing Type Tape;Transparent 1/13/2017  8:22 PM   Hub Color/Line Status Pink;Flushed 1/13/2017  8:22 PM   Action Taken Blood drawn 1/13/2017  8:22 PM        Opportunity for questions and clarification was provided. Patient transported with:   Monitor  Tech     Magnesium is running en route to unit.

## 2017-01-14 NOTE — ED TRIAGE NOTES
TRIAGE: Patient arrives by EMS from home after a syncopal event. BP 89 systolic on EMS arrival. Had a aortic valve replacement by Dr ALLEN in August of 2016. Self-proclaimed could be dehydrated.
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
## 2017-01-14 NOTE — PROGRESS NOTES
0050: Patient complained of having a headache, pt stated \" I can have Tylenol am not allergy to it\", Notified MD Bee, ordered received for 650 mg Tylenol PRN Q6Hr      Problem: Falls - Risk of  Goal: *Absence of falls  Outcome: Progressing Towards Goal  Call bell within reach, personal belongings in reach, bed locked and in low position. Non skid footwear in placed. Goal: *Knowledge of fall prevention  Outcome: Progressing Towards Goal  Uses the call bell appropriately to call for assistance         Problem: Pressure Ulcer - Risk of  Goal: *Prevention of pressure ulcer  Outcome: Progressing Towards Goal  Q4H skin assessed, Turns self at appropriate intervals and blood glucose controlled         Problem: Syncope  Goal: *Absence of injury  Outcome: Progressing Towards Goal  Pt free of injury, RN assisted pt with ambulating     Bedside and Verbal shift change report given to Concha Lombard, RN (oncoming nurse) by Zoë Agarwal RN (offgoing nurse). Report included the following information SBAR, Kardex, ED Summary, OR Summary, Intake/Output, MAR, Recent Results, Med Rec Status and Cardiac Rhythm NSR.
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
## 2017-01-15 LAB
ANION GAP BLD CALC-SCNC: 8 MMOL/L (ref 5–15)
BASOPHILS # BLD AUTO: 0 K/UL (ref 0–0.1)
BASOPHILS # BLD: 1 % (ref 0–1)
BUN SERPL-MCNC: 10 MG/DL (ref 6–20)
BUN/CREAT SERPL: 14 (ref 12–20)
CALCIUM SERPL-MCNC: 8.3 MG/DL (ref 8.5–10.1)
CHLORIDE SERPL-SCNC: 114 MMOL/L (ref 97–108)
CO2 SERPL-SCNC: 21 MMOL/L (ref 21–32)
CREAT SERPL-MCNC: 0.74 MG/DL (ref 0.55–1.02)
EOSINOPHIL # BLD: 0.3 K/UL (ref 0–0.4)
EOSINOPHIL NFR BLD: 5 % (ref 0–7)
ERYTHROCYTE [DISTWIDTH] IN BLOOD BY AUTOMATED COUNT: 14 % (ref 11.5–14.5)
GLUCOSE SERPL-MCNC: 91 MG/DL (ref 65–100)
HCT VFR BLD AUTO: 37.1 % (ref 35–47)
HGB BLD-MCNC: 12.4 G/DL (ref 11.5–16)
INR PPP: 2.2 (ref 0.9–1.1)
LYMPHOCYTES # BLD AUTO: 49 % (ref 12–49)
LYMPHOCYTES # BLD: 3.2 K/UL (ref 0.8–3.5)
MAGNESIUM SERPL-MCNC: 1.8 MG/DL (ref 1.6–2.4)
MCH RBC QN AUTO: 30.2 PG (ref 26–34)
MCHC RBC AUTO-ENTMCNC: 33.4 G/DL (ref 30–36.5)
MCV RBC AUTO: 90.5 FL (ref 80–99)
MONOCYTES # BLD: 0.5 K/UL (ref 0–1)
MONOCYTES NFR BLD AUTO: 8 % (ref 5–13)
NEUTS SEG # BLD: 2.3 K/UL (ref 1.8–8)
NEUTS SEG NFR BLD AUTO: 37 % (ref 32–75)
PHOSPHATE SERPL-MCNC: 3.1 MG/DL (ref 2.6–4.7)
PLATELET # BLD AUTO: 233 K/UL (ref 150–400)
POTASSIUM SERPL-SCNC: 4.3 MMOL/L (ref 3.5–5.1)
PROTHROMBIN TIME: 22.5 SEC (ref 9–11.1)
RBC # BLD AUTO: 4.1 M/UL (ref 3.8–5.2)
SODIUM SERPL-SCNC: 143 MMOL/L (ref 136–145)
WBC # BLD AUTO: 6.4 K/UL (ref 3.6–11)

## 2017-01-15 PROCEDURE — 74011250636 HC RX REV CODE- 250/636: Performed by: INTERNAL MEDICINE

## 2017-01-15 PROCEDURE — 36415 COLL VENOUS BLD VENIPUNCTURE: CPT | Performed by: INTERNAL MEDICINE

## 2017-01-15 PROCEDURE — 85610 PROTHROMBIN TIME: CPT | Performed by: INTERNAL MEDICINE

## 2017-01-15 PROCEDURE — 74011250637 HC RX REV CODE- 250/637: Performed by: FAMILY MEDICINE

## 2017-01-15 PROCEDURE — 74011000258 HC RX REV CODE- 258: Performed by: INTERNAL MEDICINE

## 2017-01-15 PROCEDURE — 84100 ASSAY OF PHOSPHORUS: CPT | Performed by: INTERNAL MEDICINE

## 2017-01-15 PROCEDURE — 85025 COMPLETE CBC W/AUTO DIFF WBC: CPT | Performed by: INTERNAL MEDICINE

## 2017-01-15 PROCEDURE — 74011250636 HC RX REV CODE- 250/636: Performed by: FAMILY MEDICINE

## 2017-01-15 PROCEDURE — 65660000000 HC RM CCU STEPDOWN

## 2017-01-15 PROCEDURE — 80048 BASIC METABOLIC PNL TOTAL CA: CPT | Performed by: INTERNAL MEDICINE

## 2017-01-15 PROCEDURE — 74011250637 HC RX REV CODE- 250/637: Performed by: INTERNAL MEDICINE

## 2017-01-15 PROCEDURE — 83735 ASSAY OF MAGNESIUM: CPT | Performed by: INTERNAL MEDICINE

## 2017-01-15 RX ORDER — WARFARIN SODIUM 5 MG/1
5 TABLET ORAL ONCE
Status: COMPLETED | OUTPATIENT
Start: 2017-01-15 | End: 2017-01-15

## 2017-01-15 RX ORDER — ACETAMINOPHEN 500 MG
2 TABLET ORAL
Status: DISCONTINUED | OUTPATIENT
Start: 2017-01-15 | End: 2017-01-16 | Stop reason: HOSPADM

## 2017-01-15 RX ORDER — ACETAMINOPHEN 500 MG
2 TABLET ORAL
Status: DISCONTINUED | OUTPATIENT
Start: 2017-01-15 | End: 2017-01-15 | Stop reason: SDUPTHER

## 2017-01-15 RX ADMIN — Medication 10 ML: at 14:13

## 2017-01-15 RX ADMIN — NICOTINE POLACRILEX 2 MG: 2 GUM, CHEWING ORAL at 18:23

## 2017-01-15 RX ADMIN — ACETAMINOPHEN 650 MG: 325 TABLET, FILM COATED ORAL at 08:59

## 2017-01-15 RX ADMIN — FLUOXETINE 40 MG: 20 CAPSULE ORAL at 08:55

## 2017-01-15 RX ADMIN — SODIUM CHLORIDE 150 ML/HR: 900 INJECTION, SOLUTION INTRAVENOUS at 11:53

## 2017-01-15 RX ADMIN — THERA TABS 1 TABLET: TAB at 08:56

## 2017-01-15 RX ADMIN — PIPERACILLIN SODIUM,TAZOBACTAM SODIUM 3.38 G: 3; .375 INJECTION, POWDER, FOR SOLUTION INTRAVENOUS at 07:04

## 2017-01-15 RX ADMIN — CETIRIZINE HYDROCHLORIDE 10 MG: 10 TABLET, FILM COATED ORAL at 08:55

## 2017-01-15 RX ADMIN — DOCUSATE SODIUM 100 MG: 100 CAPSULE, LIQUID FILLED ORAL at 22:50

## 2017-01-15 RX ADMIN — WARFARIN SODIUM 5 MG: 5 TABLET ORAL at 17:40

## 2017-01-15 RX ADMIN — VANCOMYCIN HYDROCHLORIDE 1000 MG: 1 INJECTION, POWDER, LYOPHILIZED, FOR SOLUTION INTRAVENOUS at 08:55

## 2017-01-15 RX ADMIN — PIPERACILLIN SODIUM,TAZOBACTAM SODIUM 3.38 G: 3; .375 INJECTION, POWDER, FOR SOLUTION INTRAVENOUS at 14:13

## 2017-01-15 RX ADMIN — PIPERACILLIN SODIUM,TAZOBACTAM SODIUM 3.38 G: 3; .375 INJECTION, POWDER, FOR SOLUTION INTRAVENOUS at 00:03

## 2017-01-15 RX ADMIN — PRAVASTATIN SODIUM 40 MG: 40 TABLET ORAL at 22:50

## 2017-01-15 RX ADMIN — ASPIRIN 81 MG 81 MG: 81 TABLET ORAL at 08:56

## 2017-01-15 RX ADMIN — Medication 10 ML: at 23:09

## 2017-01-15 NOTE — PROGRESS NOTES
0800: Bedside and Verbal shift change report given to Chandrakant Arthur (oncoming nurse) by YASMANI Zavala (offgoing nurse).  Report included the following information SBAR, Kardex, Intake/Output, MAR, Recent Results, Med Rec Status and Cardiac Rhythm NSR/ SB.

## 2017-01-15 NOTE — PROGRESS NOTES
Pharmacist Note  Warfarin Dosing  Consult provided for this 52 y. o.female to manage warfarin s/p mechanical aortic valve replacement in Aug '16    INR Goal: 2.5- 3.5 (confirmed with office notes)    Home regimen/ tablet size: 3 mg on Sun/Wed and 4 mg on M/T/Th/F/Sat    Drugs that may increase INR: None  Drugs that may decrease INR: None  Other current anticoagulants/ drugs that may increase bleeding risk: Aspirin, fluoxetine  Risk factors: None  Daily INR ordered: YES    Recent Labs      01/15/17   0421  01/15/17   0405  01/14/17   0419  01/13/17 2024   HGB   --   12.4  11.6  13.6   INR  2.2*   --   2.0*  2.2*     Date               INR                  Dose  1/13  2.2  5 mg  1/14                 2                      5 mg  1/15                 2.2                   5 mg                                                                                          Assessment/ Plan: Will order warfarin 5 mg PO x 1 dose. May want to consider bridging with Lovenox until INR is therapeutic (goal 2.5 to 3.5 for mechanical aortic valve). Pharmacy will continue to monitor daily and adjust therapy as indicated. Please contact the pharmacist at  for outpatient recommendations if needed.

## 2017-01-15 NOTE — PROGRESS NOTES
Hospitalist Progress Note  Ryan Celestin MD  Office: [phone]      Date of Service:  1/15/2017  NAME:  Bryant Rea  :  1970  MRN:  [id number]      Admission Summary:   Bryant Rea is a 52 y.o. white female with past medical history of anxiety, depression, ADD, GERD, hearing loss, hiatal hernia, hyperlipidemia, IBS, migraine headaches, mitral valve prolapse, neurotic eczema, pelvic pain, post menopausal syndrome, PTSD, TMJ, s/p CABG, s/p AVR presented to the ED via EMS from home with reported syncope. Patient reportedly had unwitnessed syncope event today. Patient reportedly was standing in the the kitchen, became suddenly dizzy, lightheaded, lost consciousness, and collapsed to the floor. Patient initially was uncertain if she had had hit her head per ED note. Patient notedly was confused after the syncopal episode. Interval history / Subjective:     Mental status better   Feels a lot better     Pt seen later in day      Assessment & Plan:     1. Altered mental status. - resolved   2. Syncope. - CT head neg  - echo normal   - PT/OT  - orthostatics NL , after IVF     3. SIRS (systemic inflammatory response syndrome). Rule out sepsis. - blood cx neg   - chest xray , ua all clear   - cont abx, only zosyn   - wbc normalized , d./c vanco   - ID consulted     6. Aortaplasty :  Long term anticoagulation on Warfarin. Pharmacy to dose Warfarin. 8. Tobacco abuse. Encourage smoking cessation. 9. Depression. On Prozac. Resume home medication. 10. Hyperlipidemia.  Continue Pravastatin.       Code status: Full   DVT prophylaxis: on coumadin     Care Plan discussed with: Patient/Family and Nurse  Disposition: TBD     Likely in am AM        Hospital Problems  Date Reviewed: 2016          Codes Class Noted POA    * (Principal)Altered mental status ICD-10-CM: R41.82  ICD-9-CM: 780.97  2017 Unknown        SIRS (systemic inflammatory response syndrome) ([facility] ICD-10-CM: R65.10  ICD-9-CM: 995.90  1/13/2017 Unknown        Syncope ICD-10-CM: R55  ICD-9-CM: 780.2  7/1/2016 Unknown                Review of Systems:   A comprehensive review of systems was negative except for that written in the HPI. Vital Signs:    Last 24hrs VS reviewed since prior progress note. Most recent are:  Visit Vitals    /85 (BP 1 Location: Right arm, BP Patient Position: At rest)    Pulse 75    Temp 97.5 °F (36.4 °C)    Resp 18    Ht 5' 3.5\" (1.613 m)    Wt 73 kg (160 lb 15 oz)    SpO2 98%    BMI 28.06 kg/m2         Intake/Output Summary (Last 24 hours) at 01/15/17 6558  Last data filed at 01/15/17 8101   Gross per 24 hour   Intake             1610 ml   Output             1625 ml   Net              -15 ml        Physical Examination:             Constitutional:  No acute distress, cooperative, pleasant    ENT:  Oral mucous moist, oropharynx benign. Neck supple,    Resp:  CTA bilaterally. No wheezing/rhonchi/rales. No accessory muscle use   CV:  Regular rhythm, normal rate, no murmurs, gallops, rubs    GI:  Soft, non distended, non tender. normoactive bowel sounds, no hepatosplenomegaly     Musculoskeletal:  No edema, warm, 2+ pulses throughout    Neurologic:  Moves all extremities.   AAOx3, CN II-XII reviewed          Data Review:    Review and/or order of clinical lab test  Review and/or order of tests in the radiology section of CPT  Review and/or order of tests in the medicine section of CPT      Labs:     Recent Labs      01/15/17   0405  01/14/17 0419   WBC  6.4  14.2*   HGB  12.4  11.6   HCT  37.1  34.3*   PLT  233  229     Recent Labs      01/15/17   0421  01/14/17   0419  01/13/17 2024   NA  143  142  134*   K  4.3  4.2  3.5   CL  114*  114*  101   CO2  21  22  25   BUN  10  9  12   CREA  0.74  0.56  0.85   GLU  91  94  149*   CA  8.3*  7.5*  8.1*   MG  1.8  2.1  1.3*   PHOS  3.1  3.1   --      Recent Labs      01/13/17 2024 SGOT  19   ALT  22   AP  77   TBILI  0.3   TP  7.1   ALB  3.4*   GLOB  3.7     Recent Labs      01/15/17   0421  01/14/17 0419 01/13/17 2024   INR  2.2*  2.0*  2.2*   PTP  22.5*  20.3*  22.6*   APTT   --   37.9*   --       No results for input(s): FE, TIBC, PSAT, FERR in the last 72 hours. Lab Results   Component Value Date/Time    Folate 13.4 04/16/2010 09:48 AM      No results for input(s): PH, PCO2, PO2 in the last 72 hours.   Recent Labs      01/14/17 0419 01/13/17 2024   TROIQ  <0.04  <0.04     Lab Results   Component Value Date/Time    Cholesterol, total 204 07/26/2016 12:29 PM    HDL Cholesterol 50 07/26/2016 12:29 PM    LDL, calculated 141 07/26/2016 12:29 PM    Triglyceride 64 07/26/2016 12:29 PM     Lab Results   Component Value Date/Time    Glucose (POC) 95 08/20/2016 06:32 AM    Glucose (POC) 113 08/19/2016 05:33 PM    Glucose (POC) 122 08/19/2016 01:09 PM    Glucose (POC) 115 08/19/2016 12:06 PM    Glucose (POC) 114 08/19/2016 11:02 AM     Lab Results   Component Value Date/Time    Color YELLOW/STRAW 01/13/2017 10:45 PM    Appearance CLEAR 01/13/2017 10:45 PM    Specific gravity 1.016 01/13/2017 10:45 PM    pH (UA) 5.5 01/13/2017 10:45 PM    Protein NEGATIVE  01/13/2017 10:45 PM    Glucose NEGATIVE  01/13/2017 10:45 PM    Ketone NEGATIVE  01/13/2017 10:45 PM    Bilirubin NEGATIVE  01/13/2017 10:45 PM    Urobilinogen 1.0 01/13/2017 10:45 PM    Nitrites NEGATIVE  01/13/2017 10:45 PM    Leukocyte Esterase NEGATIVE  01/13/2017 10:45 PM    Epithelial cells FEW 01/13/2017 10:45 PM    Bacteria NEGATIVE  01/13/2017 10:45 PM    WBC 0-4 01/13/2017 10:45 PM    RBC 0-5 01/13/2017 10:45 PM         Medications Reviewed:     Current Facility-Administered Medications   Medication Dose Route Frequency    warfarin (COUMADIN) tablet 5 mg  5 mg Oral ONCE    sodium chloride (NS) flush 5-10 mL  5-10 mL IntraVENous Q8H    sodium chloride (NS) flush 5-10 mL  5-10 mL IntraVENous PRN    aspirin chewable tablet 81 mg  81 mg Oral DAILY    cetirizine (ZYRTEC) tablet 10 mg  10 mg Oral DAILY    docusate sodium (COLACE) capsule 100 mg  100 mg Oral EVERY OTHER DAY    FLUoxetine (PROzac) capsule 40 mg  40 mg Oral DAILY    polyethylene glycol (MIRALAX) packet 17 g  17 g Oral DAILY PRN    pravastatin (PRAVACHOL) tablet 40 mg  40 mg Oral QHS    therapeutic multivitamin (THERAGRAN) tablet 1 Tab  1 Tab Oral DAILY    influenza vaccine 2016-17 (36mos+)(PF) (FLUZONE/FLUARIX/FLULAVAL QUAD) injection 0.5 mL  0.5 mL IntraMUSCular PRIOR TO DISCHARGE    acetaminophen (TYLENOL) tablet 650 mg  650 mg Oral Q6H PRN    0.9% sodium chloride infusion  150 mL/hr IntraVENous CONTINUOUS    VANCOMYCIN INFORMATION NOTE   Other PRN    WARFARIN INFORMATION NOTE (COUMADIN)   Other PRN    piperacillin-tazobactam (ZOSYN) 3.375 g in 0.9% sodium chloride (MBP/ADV) 100 mL  3.375 g IntraVENous Q8H     ______________________________________________________________________  EXPECTED LENGTH OF STAY: - - -  ACTUAL LENGTH OF STAY:          2                 Kenna Corea MD

## 2017-01-16 ENCOUNTER — TELEPHONE (OUTPATIENT)
Dept: FAMILY MEDICINE CLINIC | Age: 47
End: 2017-01-16

## 2017-01-16 VITALS
DIASTOLIC BLOOD PRESSURE: 76 MMHG | HEIGHT: 64 IN | SYSTOLIC BLOOD PRESSURE: 144 MMHG | BODY MASS INDEX: 27.4 KG/M2 | HEART RATE: 68 BPM | OXYGEN SATURATION: 99 % | WEIGHT: 160.5 LBS | TEMPERATURE: 97.4 F | RESPIRATION RATE: 18 BRPM

## 2017-01-16 PROBLEM — R41.82 ALTERED MENTAL STATUS: Status: RESOLVED | Noted: 2017-01-13 | Resolved: 2017-01-16

## 2017-01-16 PROBLEM — R65.10 SIRS (SYSTEMIC INFLAMMATORY RESPONSE SYNDROME) (HCC): Status: RESOLVED | Noted: 2017-01-13 | Resolved: 2017-01-16

## 2017-01-16 LAB
ANION GAP BLD CALC-SCNC: 9 MMOL/L (ref 5–15)
BASOPHILS # BLD AUTO: 0 K/UL (ref 0–0.1)
BASOPHILS # BLD: 0 % (ref 0–1)
BUN SERPL-MCNC: 10 MG/DL (ref 6–20)
BUN/CREAT SERPL: 14 (ref 12–20)
CALCIUM SERPL-MCNC: 8.6 MG/DL (ref 8.5–10.1)
CHLORIDE SERPL-SCNC: 110 MMOL/L (ref 97–108)
CO2 SERPL-SCNC: 22 MMOL/L (ref 21–32)
CREAT SERPL-MCNC: 0.73 MG/DL (ref 0.55–1.02)
EOSINOPHIL # BLD: 0.4 K/UL (ref 0–0.4)
EOSINOPHIL NFR BLD: 5 % (ref 0–7)
ERYTHROCYTE [DISTWIDTH] IN BLOOD BY AUTOMATED COUNT: 13.6 % (ref 11.5–14.5)
GLUCOSE SERPL-MCNC: 84 MG/DL (ref 65–100)
HCT VFR BLD AUTO: 35.9 % (ref 35–47)
HGB BLD-MCNC: 12.1 G/DL (ref 11.5–16)
INR PPP: 2.6 (ref 0.9–1.1)
LYMPHOCYTES # BLD AUTO: 43 % (ref 12–49)
LYMPHOCYTES # BLD: 3.4 K/UL (ref 0.8–3.5)
MAGNESIUM SERPL-MCNC: 1.6 MG/DL (ref 1.6–2.4)
MCH RBC QN AUTO: 29.7 PG (ref 26–34)
MCHC RBC AUTO-ENTMCNC: 33.7 G/DL (ref 30–36.5)
MCV RBC AUTO: 88.2 FL (ref 80–99)
MONOCYTES # BLD: 0.6 K/UL (ref 0–1)
MONOCYTES NFR BLD AUTO: 7 % (ref 5–13)
NEUTS SEG # BLD: 3.6 K/UL (ref 1.8–8)
NEUTS SEG NFR BLD AUTO: 45 % (ref 32–75)
PHOSPHATE SERPL-MCNC: 3.8 MG/DL (ref 2.6–4.7)
PLATELET # BLD AUTO: 253 K/UL (ref 150–400)
POTASSIUM SERPL-SCNC: 3.8 MMOL/L (ref 3.5–5.1)
PROTHROMBIN TIME: 26 SEC (ref 9–11.1)
RBC # BLD AUTO: 4.07 M/UL (ref 3.8–5.2)
SODIUM SERPL-SCNC: 141 MMOL/L (ref 136–145)
WBC # BLD AUTO: 8 K/UL (ref 3.6–11)

## 2017-01-16 PROCEDURE — 36415 COLL VENOUS BLD VENIPUNCTURE: CPT | Performed by: INTERNAL MEDICINE

## 2017-01-16 PROCEDURE — 84100 ASSAY OF PHOSPHORUS: CPT | Performed by: INTERNAL MEDICINE

## 2017-01-16 PROCEDURE — 85025 COMPLETE CBC W/AUTO DIFF WBC: CPT | Performed by: INTERNAL MEDICINE

## 2017-01-16 PROCEDURE — 80048 BASIC METABOLIC PNL TOTAL CA: CPT | Performed by: INTERNAL MEDICINE

## 2017-01-16 PROCEDURE — 74011250637 HC RX REV CODE- 250/637: Performed by: FAMILY MEDICINE

## 2017-01-16 PROCEDURE — 85610 PROTHROMBIN TIME: CPT | Performed by: INTERNAL MEDICINE

## 2017-01-16 PROCEDURE — 83735 ASSAY OF MAGNESIUM: CPT | Performed by: INTERNAL MEDICINE

## 2017-01-16 PROCEDURE — 90686 IIV4 VACC NO PRSV 0.5 ML IM: CPT | Performed by: FAMILY MEDICINE

## 2017-01-16 PROCEDURE — 74011250637 HC RX REV CODE- 250/637: Performed by: INTERNAL MEDICINE

## 2017-01-16 PROCEDURE — 90471 IMMUNIZATION ADMIN: CPT

## 2017-01-16 PROCEDURE — 74011250636 HC RX REV CODE- 250/636: Performed by: FAMILY MEDICINE

## 2017-01-16 RX ORDER — WARFARIN 4 MG/1
4 TABLET ORAL ONCE
Status: DISCONTINUED | OUTPATIENT
Start: 2017-01-16 | End: 2017-01-16 | Stop reason: HOSPADM

## 2017-01-16 RX ORDER — MAGNESIUM SULFATE HEPTAHYDRATE 40 MG/ML
2 INJECTION, SOLUTION INTRAVENOUS ONCE
Status: DISCONTINUED | OUTPATIENT
Start: 2017-01-16 | End: 2017-01-16 | Stop reason: HOSPADM

## 2017-01-16 RX ADMIN — FLUOXETINE 40 MG: 20 CAPSULE ORAL at 09:21

## 2017-01-16 RX ADMIN — THERA TABS 1 TABLET: TAB at 09:21

## 2017-01-16 RX ADMIN — INFLUENZA VIRUS VACCINE 0.5 ML: 15; 15; 15; 15 SUSPENSION INTRAMUSCULAR at 13:10

## 2017-01-16 RX ADMIN — CETIRIZINE HYDROCHLORIDE 10 MG: 10 TABLET, FILM COATED ORAL at 09:21

## 2017-01-16 RX ADMIN — ACETAMINOPHEN 650 MG: 325 TABLET, FILM COATED ORAL at 03:40

## 2017-01-16 RX ADMIN — ACETAMINOPHEN 650 MG: 325 TABLET, FILM COATED ORAL at 03:42

## 2017-01-16 RX ADMIN — ASPIRIN 81 MG 81 MG: 81 TABLET ORAL at 09:21

## 2017-01-16 RX ADMIN — NICOTINE POLACRILEX 2 MG: 2 GUM, CHEWING ORAL at 12:31

## 2017-01-16 NOTE — ADVANCED PRACTICE NURSE
Patient seen with Dr. Adriel Avery  Full consult note to follow    No CAD by cardiac cath prior to surgery  Syncope possibly related to bradycardia or arrhythmia  Will set up patient to see Dr. Audelia Delarosa as outpatient for possible implantable loop recorder  Will set up patient to see Dr. Fatimah Gerard in follow up as well  OK for discharge today    Araceli Gorman NP

## 2017-01-16 NOTE — CDMP QUERY
Dear Hospitalists,    Please clarify if this patient is being treated/managed for:    =>Metabolic encephalopathy in the setting of AMS/Syncope requiring lab/neuro monitoring, telemetry, fall precautions, Mg Sulfate, bolus,vanc/zosyn  =>Other Explanation of clinical findings  =>Unable to Determine (no explanation of clinical findings)    The medical record reflects the following clinical findings, treatment, and risk factors:    Risk Factors: 48yo with unwitnessed syncope  Clinical Indicators: wbc 22, Mg 1.3, BP 94/29, AMS/syncope/sepsis  Treatment: lab/neuro monitoring, telemetry, fall precautions, Mg Sulfate, bolus,vanc/zosyn    Please clarify and document your clinical opinion in the progress notes and discharge summary including the definitive and/or presumptive diagnosis, (suspected or probable), related to the above clinical findings. Please include clinical findings supporting your diagnosis.     Thank You  Jake Gonzalez,MSN,BSN,RN,[facility]  [phone]
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
## 2017-01-16 NOTE — PROGRESS NOTES
Pharmacist Note  Warfarin Dosing  Consult provided for this 52 y. o.female to manage warfarin s/p mechanical aortic valve replacement in Aug '16    INR Goal: 2.5- 3.5 (confirmed with office notes)    Home regimen/ tablet size: 3 mg on Sun/Wed and 4 mg on M/T/Th/F/Sat    Drugs that may increase INR: None  Drugs that may decrease INR: None  Other current anticoagulants/ drugs that may increase bleeding risk: Aspirin, fluoxetine  Risk factors: None  Daily INR ordered: YES    Recent Labs      01/16/17   0350  01/15/17   0421  01/15/17   0405  01/14/17   0419   HGB  12.1   --   12.4  11.6   INR  2.6*  2.2*   --   2.0*     Date               INR                  Dose  1/13  2.2  5 mg  1/14                 2                      5 mg  1/15                 2.2                   5 mg          1/16                 2.6                   4 mg                                                                               Assessment/ Plan: Will order warfarin 4mg PO x 1 dose. May want to consider bridging with Lovenox until INR is therapeutic (goal 2.5 to 3.5 for mechanical aortic valve). Pharmacy will continue to monitor daily and adjust therapy as indicated. Please contact the pharmacist at  for outpatient recommendations if needed.

## 2017-01-16 NOTE — PROGRESS NOTES
Problem: Falls - Risk of  Goal: *Absence of falls  Outcome: Progressing Towards Goal  Belongings at bedside, call bell within reach. Bed in locked, low position. Wearing non skid footwear. Problem: Pressure Ulcer - Risk of  Goal: *Prevention of pressure ulcer  Outcome: Progressing Towards Goal  Able to turn self in bed, able to transfer from bed to chair with minimal assistance.         Problem: Syncope  Goal: *Absence of injury  Outcome: Progressing Towards Goal  Patient remains free from falls

## 2017-01-16 NOTE — PROGRESS NOTES
Hospitalist Progress Note  Bess Ruano MD  Office: [phone]      Date of Service:  2017  NAME:  Kelly Blanco  :  1970  MRN:  [id number]      Admission Summary:   Kelly Blanco is a 52 y.o. white female with past medical history of anxiety, depression, ADD, GERD, hearing loss, hiatal hernia, hyperlipidemia, IBS, migraine headaches, mitral valve prolapse, neurotic eczema, pelvic pain, post menopausal syndrome, PTSD, TMJ, s/p CABG, s/p AVR presented to the ED via EMS from home with reported syncope. Patient reportedly had unwitnessed syncope event today. Patient reportedly was standing in the the kitchen, became suddenly dizzy, lightheaded, lost consciousness, and collapsed to the floor. Patient initially was uncertain if she had had hit her head per ED note. Patient notedly was confused after the syncopal episode. Interval history / Subjective:   Mental status better   Feels a lot better   INR therapeutic      Assessment & Plan:     1. Altered mental status. - resolved     2. Syncope. - CT head neg  - echo normal   - PT/OT  - orthostatics NL , after IVF     3. SIRS (systemic inflammatory response syndrome). Rule out sepsis. - blood cx neg   - chest xray , ua all clear   - wbc normalized , d./c vanco and zosyn  - ID consulted     6. Aortaplasty :  Long term anticoagulation on Warfarin. Pharmacy to dose Warfarin. 8. Tobacco abuse. Encourage smoking cessation. 9. Depression. On Prozac. Resume home medication. 10. Hyperlipidemia.  Continue Pravastatin.       Code status: Full   DVT prophylaxis: on coumadin     Care Plan discussed with: Patient/Family and Nurse  Disposition: TBD  Can d/c home once seen by cardio , pt asked to see Dr. Calvin Pollock before d/c     Hospital Problems  Date Reviewed: 2016          Codes Class Noted POA    * (Principal)Altered mental status ICD-10-CM: R41.82  ICD-9-CM: 780.97  2017 Unknown SIRS (systemic inflammatory response syndrome) (HCC) ICD-10-CM: R65.10  ICD-9-CM: 995.90  1/13/2017 Unknown        Syncope ICD-10-CM: R55  ICD-9-CM: 780.2  7/1/2016 Unknown                Review of Systems:   A comprehensive review of systems was negative except for that written in the HPI. Vital Signs:    Last 24hrs VS reviewed since prior progress note. Most recent are:  Visit Vitals    /76 (BP 1 Location: Right arm, BP Patient Position: Post activity)    Pulse 68    Temp 97.4 °F (36.3 °C)    Resp 18    Ht 5' 3.5\" (1.613 m)    Wt 72.8 kg (160 lb 7.9 oz)    SpO2 99%    BMI 27.98 kg/m2         Intake/Output Summary (Last 24 hours) at 01/16/17 1039  Last data filed at 01/16/17 0819   Gross per 24 hour   Intake              480 ml   Output             3700 ml   Net            -3220 ml        Physical Examination:             Constitutional:  No acute distress, cooperative, pleasant    ENT:  Oral mucous moist, oropharynx benign. Neck supple,    Resp:  CTA bilaterally. No wheezing/rhonchi/rales. No accessory muscle use   CV:  Regular rhythm, normal rate, no murmurs, gallops, rubs    GI:  Soft, non distended, non tender. normoactive bowel sounds, no hepatosplenomegaly     Musculoskeletal:  No edema, warm, 2+ pulses throughout    Neurologic:  Moves all extremities.   AAOx3, CN II-XII reviewed          Data Review:    Review and/or order of clinical lab test  Review and/or order of tests in the radiology section of CPT  Review and/or order of tests in the medicine section of CPT      Labs:     Recent Labs      01/16/17   0350  01/15/17   0405   WBC  8.0  6.4   HGB  12.1  12.4   HCT  35.9  37.1   PLT  253  233     Recent Labs      01/16/17   0350  01/15/17   0421  01/14/17   0419   NA  141  143  142   K  3.8  4.3  4.2   CL  110*  114*  114*   CO2  22  21  22   BUN  10  10  9   CREA  0.73  0.74  0.56   GLU  84  91  94   CA  8.6  8.3*  7.5*   MG  1.6  1.8  2.1   PHOS  3.8  3.1  3.1     Recent Labs 01/13/17 2024   SGOT  19   ALT  22   AP  77   TBILI  0.3   TP  7.1   ALB  3.4*   GLOB  3.7     Recent Labs      01/16/17   0350  01/15/17   0421 01/14/17 0419   INR  2.6*  2.2*  2.0*   PTP  26.0*  22.5*  20.3*   APTT   --    --   37.9*      No results for input(s): FE, TIBC, PSAT, FERR in the last 72 hours. Lab Results   Component Value Date/Time    Folate 13.4 04/16/2010 09:48 AM      No results for input(s): PH, PCO2, PO2 in the last 72 hours.   Recent Labs      01/14/17 0419 01/13/17 2024   TROIQ  <0.04  <0.04     Lab Results   Component Value Date/Time    Cholesterol, total 204 07/26/2016 12:29 PM    HDL Cholesterol 50 07/26/2016 12:29 PM    LDL, calculated 141 07/26/2016 12:29 PM    Triglyceride 64 07/26/2016 12:29 PM     Lab Results   Component Value Date/Time    Glucose (POC) 95 08/20/2016 06:32 AM    Glucose (POC) 113 08/19/2016 05:33 PM    Glucose (POC) 122 08/19/2016 01:09 PM    Glucose (POC) 115 08/19/2016 12:06 PM    Glucose (POC) 114 08/19/2016 11:02 AM     Lab Results   Component Value Date/Time    Color YELLOW/STRAW 01/13/2017 10:45 PM    Appearance CLEAR 01/13/2017 10:45 PM    Specific gravity 1.016 01/13/2017 10:45 PM    pH (UA) 5.5 01/13/2017 10:45 PM    Protein NEGATIVE  01/13/2017 10:45 PM    Glucose NEGATIVE  01/13/2017 10:45 PM    Ketone NEGATIVE  01/13/2017 10:45 PM    Bilirubin NEGATIVE  01/13/2017 10:45 PM    Urobilinogen 1.0 01/13/2017 10:45 PM    Nitrites NEGATIVE  01/13/2017 10:45 PM    Leukocyte Esterase NEGATIVE  01/13/2017 10:45 PM    Epithelial cells FEW 01/13/2017 10:45 PM    Bacteria NEGATIVE  01/13/2017 10:45 PM    WBC 0-4 01/13/2017 10:45 PM    RBC 0-5 01/13/2017 10:45 PM         Medications Reviewed:     Current Facility-Administered Medications   Medication Dose Route Frequency    warfarin (COUMADIN) tablet 4 mg  4 mg Oral ONCE    nicotine (NICORETTE) gum 2 mg  2 mg Oral Q2H PRN    sodium chloride (NS) flush 5-10 mL  5-10 mL IntraVENous Q8H    sodium chloride (NS) flush 5-10 mL  5-10 mL IntraVENous PRN    aspirin chewable tablet 81 mg  81 mg Oral DAILY    cetirizine (ZYRTEC) tablet 10 mg  10 mg Oral DAILY    docusate sodium (COLACE) capsule 100 mg  100 mg Oral EVERY OTHER DAY    FLUoxetine (PROzac) capsule 40 mg  40 mg Oral DAILY    polyethylene glycol (MIRALAX) packet 17 g  17 g Oral DAILY PRN    pravastatin (PRAVACHOL) tablet 40 mg  40 mg Oral QHS    therapeutic multivitamin (THERAGRAN) tablet 1 Tab  1 Tab Oral DAILY    influenza vaccine 2016-17 (36mos+)(PF) (FLUZONE/FLUARIX/FLULAVAL QUAD) injection 0.5 mL  0.5 mL IntraMUSCular PRIOR TO DISCHARGE    acetaminophen (TYLENOL) tablet 650 mg  650 mg Oral Q6H PRN    WARFARIN INFORMATION NOTE (COUMADIN)   Other PRN     ______________________________________________________________________  EXPECTED LENGTH OF STAY: - - -  ACTUAL LENGTH OF STAY:          3                 Misti Li MD

## 2017-01-16 NOTE — TELEPHONE ENCOUNTER
Dalia Newton  [phone]    Patient was recently hospitalized and diagnosed with Systemic Inflammatory Response Syndrome. When she was released she was told to follow up with her PCP within a week. She is requesting a work in appointment with Dr. Pito Olivia within the next two weeks. She declined an appointment with another provider.

## 2017-01-16 NOTE — PROGRESS NOTES
Infectious Diseases Consultation     Please see dictated note     Impression      1. Leukocytosis -- resolved; no fever, negative blood cultures    Recommend:      1.  D/C antibiotics and watch off antibiotics      Thanks,   Cassius Calles MD  1/15/2017  10:35 PM
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
## 2017-01-16 NOTE — DISCHARGE INSTRUCTIONS
Discharge Instructions       PATIENT ID: Jalen Mendoza  MRN: [id number]   YOB: 1970    DATE OF ADMISSION: 1/13/2017  8:05 PM    DATE OF DISCHARGE: 1/16/2017    PRIMARY CARE PROVIDER: Esteban Dubin, MD     ATTENDING PHYSICIAN: Denise Kapoor MD  DISCHARGING PROVIDER: Denise Kapoor MD    To contact this individual call [phone] and ask the  to page. If unavailable ask to be transferred the Adult Hospitalist Department. DISCHARGE DIAGNOSES Syncope     CONSULTATIONS: IP CONSULT TO HOSPITALIST  IP CONSULT TO CARDIOLOGY  IP CONSULT TO INFECTIOUS DISEASES    PROCEDURES/SURGERIES: * No surgery found *    PENDING TEST RESULTS:   At the time of discharge the following test results are still pending:     FOLLOW UP APPOINTMENTS:   Follow-up Information     Follow up With Details Comments Jamel De La Rosa MD In 1 week  Mahad Potter       Edward Abrams MD In 2 weeks  [address] [place] [id number]             ADDITIONAL CARE RECOMMENDATIONS: follow up with cardiology for further w/u    DIET: Cardiac Diet    ACTIVITY: Activity as tolerated    WOUND CARE:     EQUIPMENT needed:       DISCHARGE MEDICATIONS:   See Medication Reconciliation Form    · It is important that you take the medication exactly as they are prescribed. · Keep your medication in the bottles provided by the pharmacist and keep a list of the medication names, dosages, and times to be taken in your wallet. · Do not take other medications without consulting your doctor. NOTIFY YOUR PHYSICIAN FOR ANY OF THE FOLLOWING:   Fever over 101 degrees for 24 hours. Chest pain, shortness of breath, fever, chills, nausea, vomiting, diarrhea, change in mentation, falling, weakness, bleeding. Severe pain or pain not relieved by medications. Or, any other signs or symptoms that you may have questions about.       DISPOSITION:  x Home With:   OT  PT  [facility]  RN       SNF/Inpatient Rehab/LTAC    Independent/assisted living    Hospice    Other:     CDMP Checked:   Yes x     PROBLEM LIST Updated:  Yes x       Signed:   Curtis Krueger MD  1/16/2017  12:10 PM

## 2017-01-16 NOTE — PROGRESS NOTES
36 - Pt was upset that she hadn't seen the physician. Dr. Carol Kilgore was called. Patient was seen by Dr. Corby Rich and Carol Kilgore.

## 2017-01-16 NOTE — CONSULTS
[address]       Name:  Susan Morgan   MR#:  [id number]   :  1970   Account #:  [de-identified]    Date of Consultation:  01/15/2017   Date of Adm:  2017       INFECTIOUS DISEASE CONSULTATION    ATTENDING/REQUESTING PHYSICIAN: Deann Gastelum MD.    CHIEF COMPLAINT: Fainting episode. REASON FOR CONSULTATION: Leukocytosis on admission. The   consultation was requested by Dr. Attila Sorto. SOURCE OF INFORMATION: The history is obtained by interview of   the patient and review of the medical records. HISTORY OF PRESENT ILLNESS: This patient is a 49-year-old white   female who states that she had congenital bicuspid aortic valve and a   small aortic root. Because of this, she underwent aortic valve   replacement and either repair or replacement of the aortic root on   2016. 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 The patient has done   well since then. This past week, the patient noted some fatigue and exhaustion. She   had a couple of nights where she could not sleep. However, she had   no other symptoms. On 2017, she had a little nausea and a little   dizziness along with the fatigue. On 2017 she felt better. On   2017 she felt well during the day and worked. However, that   evening while she was preparing her medications in the dining room,   she had a syncopal episode with no aura. She was taken to the emergency room and found to have a white   count of 22,000. No source of infection was found. She was initially   started on vancomycin and Zosyn after cultures were obtained. She   has had no fever over the past 48 hours in the hospital and her white   count has now returned to normal. We are now asked to see her for   further evaluation.  At the present time, the patient feels well and is   anxious to try to go home. PAST MEDICAL HISTORY: Tobacco: She used to smoke a pack per   day and is now down to about 6 cigarettes per day. ALCOHOL: Occasional social use. MEDICATIONS PRIOR TO ADMISSION:   1. Aspirin 81 mg p.o. daily. 2. Zyrtec 10 mg p.o. daily as needed. 3. Colace 100 mg p.o. every other day. 4. Relpax 1 p.o. hours as needed and can repeat in 2 hours if needed. 5. Prozac 40 mg p.o. daily. 6. MiraLAX 17 grams p.o. daily p.r.n.   7. Pravastatin 40 mg p.o. nightly. 8. Multivitamins. 9. Coumadin. ALLERGIES:   1. INTRAVENOUS CONTRAST AGENTS--ANAPHYLAXIS. 2. PERCOCET--DIZZINESS. 3. THE COMBINATION OF VICODIN PLUS IBUPROFEN--VOMITING. 4. SULFA--GI SYMPTOMS. ILLNESSES:   1. Congenital heart disease--congenital bicuspid aortic valve and a   congenitally small aortic root leading to aortic valve replacement and   repair or replacement of the aortic root on 08/18/2016.   2. History of irritable bowel syndrome. 3. History of eczema. 4. History of migraine headaches. 5. Hyperlipidemia. 6. Hiatal hernia. 7. History of anxiety and depression. SURGERIES:   1. Aortic valve replacement for a bicuspid aortic valve and repair or   replacement of the aortic root on 08/18/2016--At the time of surgery,   the right coronary artery became occluded during repair or   replacement of the aortic root, and a bypass was done to the right   coronary artery. 2. Hysterectomy and bilateral salpingo-oophorectomy. 3. LEEP. 4. Umbilical hernia repair. 5. Tonsillectomy. SOCIAL HISTORY: She lives here in the [place] area. FAMILY HISTORY: Otherwise unremarkable. REVIEW OF SYSTEMS   CONSTITUTIONAL: No fever, chills, sweats, or systemic symptoms   prior to admission. HEENT: She does have a history of recurrent migraine headaches. She has a history of some hearing loss as well. No recent change in   vision.    LYMPHATIC: No history of adenopathy or lymphoma. DERMATOLOGIC: No recent rashes. RESPIRATORY: No cough, pleuritic chest pain, or hemoptysis. CARDIOVASCULAR: No chest pain. GASTROINTESTINAL: She had a little nausea on 01/11/2017 without   vomiting. No diarrhea. No other GI complaints. GENITOURINARY: No dysuria or hematuria. MUSCULOSKELETAL: No history of myalgias or arthralgias. NEUROLOGIC: She had a syncopal episode on admission as in HPI. PHYSICAL EXAMINATION   GENERAL: No acute distress. VITAL SIGNS: Blood pressure is 152/85, heart rate 61, respiratory rate   18. She has been afebrile throughout her hospital stay, weight 154 to   160 pounds. HEENT: Sclerae and conjunctivae benign, without petechiae, EOMI,   PERRL, oropharynx benign. NECK: Supple without thyromegaly. NODES: No adenopathy. SKIN: No rashes. LUNGS: Clear. CARDIOVASCULAR: Regular rate and rhythm with crisp prosthetic   valve sounds, no diastolic murmur. ABDOMEN: Soft, nondistended, nontender, no masses or   hepatosplenomegaly, bowel sounds are present. BACK: No CVA tenderness. PELVIC: Exam not done. EXTREMITIES: No edema. No cellulitis. No calf tenderness. MUSCULOSKELETAL: Muscles nontender and joints benign. NEUROLOGIC: Alert and oriented. LABORATORY DATA: CBC on admission to the hospital revealed a   hemoglobin of 13.6, white count 22,000 with 80% neutrophils and 13%   lymphocytes. Today, the white count is 6400 with 37% neutrophils,   49% lymphocytes. Platelet count is normal. Chemistry data on   admission revealed a BUN of 12, creatinine 0.85, with normal liver   function tests. Urinalysis on admission had 0-4 white cells and 0-5 red   cells. RADIOLOGY DATA   HEAD CT SCAN: The CT scan of the head on admission without IV   contrast was normal.    CHEST X-RAY: The Chest x-ray on admission revealed no pulmonary   infiltrates. IMPRESSION AND RECOMMENDATIONS:   1. Leukocytosis--resolved:  This patient had no symptoms to suggest infection before the syncopal episode. She has had no symptoms or   signs of a focal infection since admission to the hospital. She did have   a significant leukocytosis on admission, but it is now resolved and she   has only about 40% neutrophils. She has been on vancomycin and   Zosyn. Blood cultures are negative. I would favor stopping antibiotics. If she continues to do well, I think she can go home with repeat   evaluation if her problems recur. The plan will be to discontinue   vancomycin and Zosyn. 2. Congenital bicuspid aortic valve, leading to aortic valve replacement   and repair or replacement of a congenitally small aortic root, all on   08/18/2016--as in HPI. Thank you very much for letting us see this patient with you.         MD Mayra Maddox / [place]   D:  01/15/2017   23:10   T:  01/16/2017   04:25   Job #:  [id number]

## 2017-01-16 NOTE — PROGRESS NOTES
Occupational Therapy Note 9426    S: \"I'm just waiting to see my cardiologist. Otherwise, I feel fine! \"    O/A: Patient received supine in bed, cleared for therapy by nursing. Patient has been evaluated/discharged from PT services, has been up ad myrna. Received at the bedside completing work phone call. Patient reporting she was told she needed assist when OOB d/t hypertension, however spoke with RN and patient is able to mobilize to bathroom as needed. Patient sat EOB, BP was elevated 177/94, nursing cleared for bathroom. P: Patient with no further acute OT needs, is at functional baseline. Will complete orders at this time. Thank you.     Cortes Cade, OTR/L  Time spent with patient: 11 minutes

## 2017-01-18 LAB
BACTERIA SPEC CULT: NORMAL
SERVICE CMNT-IMP: NORMAL

## 2017-01-19 ENCOUNTER — TELEPHONE (OUTPATIENT)
Dept: FAMILY MEDICINE CLINIC | Age: 47
End: 2017-01-19

## 2017-01-20 NOTE — TELEPHONE ENCOUNTER
Referral Request Telephone Call      Insurance Name:     Jose R ID:  [id number]   Specialist Name: Marilyn Childers   Type of Specialty:  Cardiology    Address of Specialist:  [address], [place], [address] [place] [address] Avenue   Phone/Fax Number of Specialist: Phone # [phone]  Fax # [phone]   Diagnosis: On coumadin; post heart surgery follow ups   Appointment Date: Backdated to January 1st   NPI    Tax ID      Patient is requesting a call once request sent to [facility]

## 2017-01-22 DIAGNOSIS — R79.89 ABNORMAL CBC: ICD-10-CM

## 2017-01-23 ENCOUNTER — TELEPHONE (OUTPATIENT)
Dept: FAMILY MEDICINE CLINIC | Age: 47
End: 2017-01-23

## 2017-01-23 ENCOUNTER — OFFICE VISIT (OUTPATIENT)
Dept: CARDIOLOGY CLINIC | Age: 47
End: 2017-01-23

## 2017-01-23 VITALS
DIASTOLIC BLOOD PRESSURE: 72 MMHG | HEART RATE: 91 BPM | RESPIRATION RATE: 16 BRPM | WEIGHT: 155.2 LBS | BODY MASS INDEX: 26.5 KG/M2 | HEIGHT: 64 IN | SYSTOLIC BLOOD PRESSURE: 110 MMHG | OXYGEN SATURATION: 98 %

## 2017-01-23 DIAGNOSIS — I35.0 NONRHEUMATIC AORTIC VALVE STENOSIS: Primary | ICD-10-CM

## 2017-01-23 DIAGNOSIS — R55 VASOVAGAL SYNCOPE: ICD-10-CM

## 2017-01-23 RX ORDER — FLUOXETINE HYDROCHLORIDE 40 MG/1
CAPSULE ORAL
Qty: 30 CAP | Refills: 0 | Status: SHIPPED | OUTPATIENT
Start: 2017-01-23 | End: 2017-02-21 | Stop reason: SINTOL

## 2017-01-23 NOTE — MR AVS SNAPSHOT
Visit Information Date & Time Provider Department Dept. Phone Encounter #  
 1/23/2017 11:00 AM Deana Soto MD [facility] [address] Metzger [phone] [id number] Follow-up Instructions Return in about 6 months (around 7/23/2017). Your Appointments 2/2/2017  9:00 AM  
New Patient with Joanna Suarez MD  
[address] ([facility]) Appt Note: per JANETT Hanna, consult for possible implantable loop placement [address] [place] [address]  
[address] [address]  
  
    
 2/3/2017  3:40 PM  
COUMADIN CLINIC with EFREM JULIEN [facility] ([facility] SCHEDULING) Appt Note: 1 month  
 [address] [place] [address] [address]  
[address]  
  
    
 3/2/2017  8:40 AM  
ESTABLISHED PATIENT with Deana Soto MD  
[facility] ([facility]) Appt Note: 3 month follow up; 3 mo f/u  
 [address] [place] [address] [address]  
[address] [address]  
  
    
  
 1/26/2017 10:30 AM  
TRANSITIONAL CARE MANAGEMENT with Trang Yen MD  
[facility]) Appt Note: per Dr Obinna Mattson [address] [id number]  
[phone]  
  
   
 [address] [address] Upcoming Health Maintenance Date Due Pneumococcal 19-64 Medium Risk (1 of 1 - PPSV23) 1/7/1989 DTaP/Tdap/Td series (1 - Tdap) 1/7/1991 PAP AKA CERVICAL CYTOLOGY 1/7/1991 Allergies as of 1/23/2017  Review Complete On: 1/23/2017 By: Deana Soto MD  
  
 Severity Noted Reaction Type Reactions Contrast Dye [Iodine] High 03/02/2010   Systemic Anaphylaxis Lipitor [Atorvastatin]  03/21/2014    Myalgia And GI upset Percocet [Oxycodone-acetaminophen]  09/24/2015    Other (comments) Dizziness/fall Sulfa (Sulfonamide Antibiotics)    Other (comments) Gi upset Tramadol  09/29/2016    Rash Vicoprofen [Hydrocodone-ibuprofen]  06/25/2014    Nausea and Vomiting Can take hydrocodone and ibuprofen separately, but not together Current Immunizations  Reviewed on 1/16/2017 Name Date Influenza Vaccine 11/13/2015 Influenza Vaccine Nadean Nesbitt) 11/16/2016 Influenza Vaccine (Quad) PF 1/16/2017  1:10 PM  
 Influenza Vaccine Whole 11/23/2007 Not reviewed this visit You Were Diagnosed With   
  
 Codes Comments Nonrheumatic aortic valve stenosis    -  Primary ICD-10-CM: I35.0 ICD-9-CM: 424.1 Vasovagal syncope     ICD-10-CM: R55 
ICD-9-CM: 780. 2 Vitals BP Pulse Resp Height(growth percentile) Weight(growth percentile) SpO2  
 110/72 (BP 1 Location: Left arm, BP Patient Position: Sitting) 91 16 5' 3.5\" (1.613 m) 155 lb 3.2 oz (70.4 kg) 98% BMI OB Status Smoking Status 27.06 kg/m2 Hysterectomy Current Every Day Smoker Vitals History BMI and BSA Data Body Mass Index Body Surface Area  
 27.06 kg/m 2 1.78 m 2 Preferred Pharmacy Pharmacy Name Phone [address] [place]. [phone] Your Updated Medication List  
  
   
This list is accurate as of: 1/23/17 11:26 AM.  Always use your most recent med list.  
  
  
  
  
 aspirin 81 mg chewable tablet Take 1 Tab by mouth daily. docusate sodium 100 mg capsule Commonly known as:  Marc Barefoot Take 100 mg by mouth every other day. eletriptan 40 mg tablet Commonly known as:  RELPAX Take 1 Tab by mouth once as needed for up to 1 dose. may repeat in 2 hours if necessary FLUoxetine 40 mg capsule Commonly known as:  PROzac TAKE ONE CAPSULE BY MOUTH DAILY MIRALAX 17 gram/dose powder Generic drug:  polyethylene glycol Take 17 g by mouth as needed (for constipation). pravastatin 40 mg tablet Commonly known as:  PRAVACHOL Take 40 mg by mouth nightly. therapeutic multivitamin tablet Commonly known as:  [facility] Take 1 Tab by mouth daily. * warfarin 1 mg tablet Commonly known as:  COUMADIN Take 4 mg by mouth five (5) days a week. Every day except Wednesday and Saturday * warfarin 1 mg tablet Commonly known as:  COUMADIN Take 3 mg by mouth two (2) days a week. On Wednesday and Saturday ZyrTEC 10 mg tablet Generic drug:  cetirizine Take 10 mg by mouth as needed. * Notice: This list has 2 medication(s) that are the same as other medications prescribed for you. Read the directions carefully, and ask your doctor or other care provider to review them with you. Follow-up Instructions Return in about 6 months (around 7/23/2017). Introducing [facility]! Dear Doug Clemens: 
Thank you for requesting a "[employer] account. Our records indicate that you have previously registered for a "[employer] account but its currently inactive. Please call our "[employer] support line at [phone]. Additional Information If you have questions, please visit the Frequently Asked Questions section of the "[employer] website at [email or web address] Remember, "[employer] is NOT to be used for urgent needs. For medical emergencies, dial 911. Now available from your iPhone and Android! Please provide this summary of care documentation to your next provider. Your primary care clinician is listed as ROBBIE MCGOVERN. If you have any questions after today's visit, please call [phone].

## 2017-01-23 NOTE — TELEPHONE ENCOUNTER
Edilbertodaphne Sy just stopped in to pick-up the referral. She states that she needs the referral back dated to 1/1/17 because she already saw the doctor for a coumadin check. She has an appointment today at 11:00am and could not wait.  [address]
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
## 2017-01-26 ENCOUNTER — OFFICE VISIT (OUTPATIENT)
Dept: FAMILY MEDICINE CLINIC | Age: 47
End: 2017-01-26

## 2017-01-26 VITALS
HEIGHT: 64 IN | WEIGHT: 156 LBS | BODY MASS INDEX: 26.63 KG/M2 | HEART RATE: 70 BPM | DIASTOLIC BLOOD PRESSURE: 72 MMHG | OXYGEN SATURATION: 98 % | TEMPERATURE: 98.1 F | RESPIRATION RATE: 18 BRPM | SYSTOLIC BLOOD PRESSURE: 104 MMHG

## 2017-01-26 DIAGNOSIS — E78.00 HYPERCHOLESTEROLEMIA: ICD-10-CM

## 2017-01-26 DIAGNOSIS — R55 SYNCOPE, UNSPECIFIED SYNCOPE TYPE: Primary | ICD-10-CM

## 2017-01-26 DIAGNOSIS — Z95.1 S/P CABG X 1: ICD-10-CM

## 2017-01-26 DIAGNOSIS — Z95.2 S/P AVR (AORTIC VALVE REPLACEMENT) AND AORTOPLASTY: ICD-10-CM

## 2017-01-26 NOTE — PATIENT INSTRUCTIONS

## 2017-01-26 NOTE — PROGRESS NOTES
Chief Complaint   Patient presents with   [facility] Follow Up     [facility] -Sync[facility] -had became dizzy and weak and passed out and fell to the floor in kitchen     1. Have you been to the ER, urgent care clinic since your last visit? Hospitalized since your last visit? Yes see above     2. Have you seen or consulted any other health care providers outside of the [facility] [place] [address] [place] since your last visit? Include any pap smears or colon screening.  No

## 2017-01-26 NOTE — MR AVS SNAPSHOT
Visit Information Date & Time Provider Department Dept. Phone Encounter #  
 1/26/2017 10:30 AM [address] [phone] [id number] Your Appointments 2/3/2017  3:40 PM  
[facility] with EFREM JULIEN [facility] ([facility] SCHEDULING) Appt Note: 1 month  
 [address] [place] [address]  
[address] [address]  
  
    
 2/9/2017  3:40 PM  
New Patient with Hassel Collet, MD  
[facility] ([address] [place] [address]) Appt Note: per NP Cyndi, consult for possible implantable loop placement; pt r/s'ed to this date [address] [facility] [address]  
[address] [facility] [place]  
  
    
 3/2/2017  8:40 AM  
ESTABLISHED PATIENT with Myrna Robbins MD  
[facility] ([address] [place] [address]) Appt Note: 3 month follow up; 3 mo f/u  
 [address] [place] [address]  
[address] [address] Upcoming Health Maintenance Date Due Pneumococcal 19-64 Medium Risk (1 of 1 - PPSV23) 1/7/1989 DTaP/Tdap/Td series (1 - Tdap) 1/7/1991 PAP AKA CERVICAL CYTOLOGY 1/7/1991 Allergies as of 1/26/2017  Review Complete On: 1/26/2017 By: [address], [place]  
  
 Severity Noted Reaction Type Reactions Contrast Dye [Iodine] High 03/02/2010   Systemic Anaphylaxis Lipitor [Atorvastatin]  03/21/2014    Myalgia And GI upset Percocet [Oxycodone-acetaminophen]  09/24/2015    Other (comments) Dizziness/fall Sulfa (Sulfonamide Antibiotics)    Other (comments) Gi upset Tramadol  09/29/2016    Rash Vicoprofen [Hydrocodone-ibuprofen]  06/25/2014    Nausea and Vomiting Can take hydrocodone and ibuprofen separately, but not together Current Immunizations  Reviewed on 1/16/2017 Name Date Influenza Vaccine 11/13/2015 Influenza Vaccine Shareetoshia Alejandre) 11/16/2016 Influenza Vaccine (Quad) PF 1/16/2017  1:10 PM  
 Influenza Vaccine Whole 11/23/2007 Not reviewed this visit You Were Diagnosed With   
  
 Codes Comments Syncope, unspecified syncope type    -  Primary ICD-10-CM: R55 
ICD-9-CM: 780.2 S/P AVR (aortic valve replacement) and aortoplasty     ICD-10-CM: Z95.2 ICD-9-CM: V43.3 S/P CABG x 1     ICD-10-CM: Z95.1 ICD-9-CM: V45.81 Hypercholesterolemia     ICD-10-CM: E78.00 ICD-9-CM: 272.0 Vitals BP Pulse Temp Resp Height(growth percentile) Weight(growth percentile) 104/72 (BP 1 Location: Left arm, BP Patient Position: Sitting) 70 98.1 °F (36.7 °C) (Oral) 18 5' 3.5\" (1.613 m) 156 lb (70.8 kg) SpO2 BMI OB Status Smoking Status 98% 27.2 kg/m2 Hysterectomy Current Every Day Smoker BMI and BSA Data Body Mass Index Body Surface Area  
 27.2 kg/m 2 1.78 m 2 Preferred Pharmacy Pharmacy Name Phone [facility] [address] [place] [address], [address] [place] [address] [facility]. [phone] Your Updated Medication List  
  
   
This list is accurate as of: 1/26/17  1:03 PM.  Always use your most recent med list.  
  
  
  
  
 aspirin 81 mg chewable tablet Take 1 Tab by mouth daily. docusate sodium 100 mg capsule Commonly known as:  Sherl Bicker Take 100 mg by mouth every other day. eletriptan 40 mg tablet Commonly known as:  RELPAX Take 1 Tab by mouth once as needed for up to 1 dose. may repeat in 2 hours if necessary FLUoxetine 40 mg capsule Commonly known as:  PROzac TAKE ONE CAPSULE BY MOUTH DAILY pravastatin 40 mg tablet Commonly known as:  PRAVACHOL Take 40 mg by mouth nightly. therapeutic multivitamin tablet Commonly known as:  [facility] Take 1 Tab by mouth daily. warfarin 1 mg tablet Commonly known as:  COUMADIN Take 4 mg by mouth five (5) days a week. Every day except Wednesday and Saturday takes 3 pills ZyrTEC 10 mg tablet Generic drug:  cetirizine Take 10 mg by mouth as needed. We Performed the Following REFERRAL TO CARDIOLOGY [JEO73 Custom] Comments:  
 Patient is scheduled for appt w/ Dr Daniel Rocha on 2-9-17 but requires referral for her insurance prior to appt Referral Information Referral ID Referred By Referred To  
  
 [id number] Josselin MCGOVERN  [address] Phone: [phone] Fax: [phone] Visits Status Start Date End Date 1 New Request 1/26/17 1/26/18 If your referral has a status of pending review or denied, additional information will be sent to support the outcome of this decision. Patient Instructions Fainting: Care Instructions Your Care Instructions When you faint, or pass out, you lose consciousness for a short time. A brief drop in blood flow to the brain often causes it. When you fall or lie down, more blood flows to your brain and you regain consciousness. Emotional stress, pain, or overheatingespecially if you have been standingcan make you faint. In these cases, fainting is usually not serious. But fainting can be a sign of a more serious problem. Your doctor may want you to have more tests to rule out other causes. The treatment you need depends on the reason why you fainted. The doctor has checked you carefully, but problems can develop later. If you notice any problems or new symptoms, get medical treatment right away. Follow-up care is a key part of your treatment and safety. Be sure to make and go to all appointments, and call your doctor if you are having problems. It's also a good idea to know your test results and keep a list of the medicines you take. How can you care for yourself at home? · Drink plenty of fluids to prevent dehydration. If you have kidney, heart, or liver disease and have to limit fluids, talk with your doctor before you increase your fluid intake. When should you call for help? Call 911 anytime you think you may need emergency care. For example, call if: 
· You have symptoms of a heart problem. These may include: ¨ Chest pain or pressure. ¨ Severe trouble breathing. ¨ A fast or irregular heartbeat. ¨ Lightheadedness or sudden weakness. ¨ Coughing up pink, foamy mucus. ¨ Passing out. After you call 911, the  may tell you to chew 1 adult-strength or 2 to 4 low-dose aspirin. Wait for an ambulance. Do not try to drive yourself. · You have symptoms of a stroke. These may include: 
¨ Sudden numbness, tingling, weakness, or loss of movement in your face, arm, or leg, especially on only one side of your body. ¨ Sudden vision changes. ¨ Sudden trouble speaking. ¨ Sudden confusion or trouble understanding simple statements. ¨ Sudden problems with walking or balance. ¨ A sudden, severe headache that is different from past headaches. · You passed out (lost consciousness) again. Watch closely for changes in your health, and be sure to contact your doctor if: 
· You do not get better as expected. Where can you learn more? Go to http://liya-valerio.info/. Enter P475 in the search box to learn more about \"Fainting: Care Instructions. \" Current as of: May 27, 2016 Content Version: 11.1 © 3346-6420 [employer]. Care instructions adapted under license by organgir[employer]am (which disclaims liability or warranty for this information). If you have questions about a medical condition or this instruction, always ask your healthcare professional. [address] any warranty or liability for your use of this information. Introducing [facility]! Dear Sriram Melendez: Thank you for requesting a [employer] account. Our records indicate that you have previously registered for a [employer] account but its currently inactive. Please call our [employer] support line at [phone]. Additional Information If you have questions, please visit the Frequently Asked Questions section of the [employer] website at [email or web address] Remember, Inst[employer]over is NOT to be used for urgent needs. For medical emergencies, dial 911. Now available from your iPhone and Android! Please provide this summary of care documentation to your next provider. Your primary care clinician is listed as ROBBIE MCGOVERN. If you have any questions after today's visit, please call [phone].
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
## 2017-02-03 ENCOUNTER — CLINICAL SUPPORT (OUTPATIENT)
Dept: CARDIOLOGY CLINIC | Age: 47
End: 2017-02-03

## 2017-02-03 DIAGNOSIS — Z95.2 S/P AVR (AORTIC VALVE REPLACEMENT) AND AORTOPLASTY: Primary | ICD-10-CM

## 2017-02-03 DIAGNOSIS — Z79.01 LONG TERM (CURRENT) USE OF ANTICOAGULANTS: ICD-10-CM

## 2017-02-03 LAB
INR BLD: NORMAL
INR, EXTERNAL: 2.4 (ref 2.5–3.5)
PT POC: NORMAL SEC
VALID INTERNAL CONTROL?: YES

## 2017-02-03 NOTE — PROGRESS NOTES

## 2017-02-09 ENCOUNTER — OFFICE VISIT (OUTPATIENT)
Dept: CARDIOLOGY CLINIC | Age: 47
End: 2017-02-09

## 2017-02-09 VITALS
BODY MASS INDEX: 27.64 KG/M2 | DIASTOLIC BLOOD PRESSURE: 62 MMHG | HEART RATE: 60 BPM | SYSTOLIC BLOOD PRESSURE: 106 MMHG | WEIGHT: 156 LBS | HEIGHT: 63 IN

## 2017-02-09 DIAGNOSIS — I35.0 NONRHEUMATIC AORTIC VALVE STENOSIS: ICD-10-CM

## 2017-02-09 DIAGNOSIS — Q23.1 BICUSPID AORTIC VALVE: ICD-10-CM

## 2017-02-09 DIAGNOSIS — Z95.2 S/P AORTIC VALVE REPLACEMENT: ICD-10-CM

## 2017-02-09 DIAGNOSIS — R55 SYNCOPE AND COLLAPSE: Primary | ICD-10-CM

## 2017-02-09 DIAGNOSIS — R94.31 LONG QT INTERVAL: ICD-10-CM

## 2017-02-09 DIAGNOSIS — Z95.1 S/P CABG X 1: ICD-10-CM
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
## 2017-02-09 NOTE — PROGRESS NOTES
Chief Complaint   Patient presents with    Syncope     possible ILR     Verified medications with the patient. Verified pharmacy with patient.

## 2017-02-09 NOTE — MR AVS SNAPSHOT
Visit Information Date & Time Provider Department Dept. Phone Encounter #  
 2/9/2017  3:40 PM Anselmo Weaver MD [facility] [place] [place] [phone] [id number] Your Appointments 3/2/2017  8:40 AM  
ESTABLISHED PATIENT with Clarke Park MD  
[facility] ([address]) Appt Note: 3 month follow up; 3 mo f/u  
 [address]  
[address] [address]  
  
    
 3/2/2017  9:00 AM  
[facility]MADIN CLINIC with EFREM JULIEN [facility] ([facility] SCHEDULING) Appt Note: 1 month  
 [address]  
[address] [place] [id number] Upcoming Health Maintenance Date Due Pneumococcal 19-64 Medium Risk (1 of 1 - PPSV23) 1/7/1989 DTaP/Tdap/Td series (1 - Tdap) 1/7/1991 PAP AKA CERVICAL CYTOLOGY 1/7/1991 Allergies as of 2/9/2017  Review Complete On: 2/9/2017 By: Anselmo Weaver MD  
  
 Severity Noted Reaction Type Reactions Contrast Dye [Iodine] High 03/02/2010   Systemic Anaphylaxis Lipitor [Atorvastatin]  03/21/2014    Myalgia And GI upset Percocet [Oxycodone-acetaminophen]  09/24/2015    Other (comments) Dizziness/fall Sulfa (Sulfonamide Antibiotics)    Other (comments) Gi upset Tramadol  09/29/2016    Rash Vicoprofen [Hydrocodone-ibuprofen]  06/25/2014    Nausea and Vomiting Can take hydrocodone and ibuprofen separately, but not together Current Immunizations  Reviewed on 1/16/2017 Name Date Influenza Vaccine 11/13/2015 Influenza Vaccine Abel Wilman) 11/16/2016 Influenza Vaccine (Quad) PF 1/16/2017  1:10 PM  
 Influenza Vaccine Whole 11/23/2007 Not reviewed this visit You Were Diagnosed With   
  
 Codes Comments Syncope and collapse    -  Primary ICD-10-CM: R55 
ICD-9-CM: 780.2 Bicuspid aortic valve     ICD-10-CM: Q23.1 ICD-9-CM: 746.4 Nonrheumatic aortic valve stenosis     ICD-10-CM: I35.0 ICD-9-CM: 424.1 S/P CABG x 1     ICD-10-CM: Z95.1 ICD-9-CM: V45.81 S/P aortic valve replacement     ICD-10-CM: Z95.2 ICD-9-CM: V43.3 Vitals BP Pulse Height(growth percentile) Weight(growth percentile) BMI OB Status 106/62 (BP 1 Location: Left arm, BP Patient Position: Sitting) 60 5' 3\" (1.6 m) 156 lb (70.8 kg) 27.63 kg/m2 Hysterectomy Smoking Status Current Every Day Smoker Vitals History BMI and BSA Data Body Mass Index Body Surface Area  
 27.63 kg/m 2 1.77 m 2 Preferred Pharmacy Pharmacy Name Phone [id number] [address] [phone] Your Updated Medication List  
  
   
This list is accurate as of: 2/9/17  4:28 PM.  Always use your most recent med list.  
  
  
  
  
 aspirin 81 mg chewable tablet Take 1 Tab by mouth daily. docusate sodium 100 mg capsule Commonly known as:  Zeenat Lowers Take 100 mg by mouth every other day. eletriptan 40 mg tablet Commonly known as:  RELPAX Take 1 Tab by mouth once as needed for up to 1 dose. may repeat in 2 hours if necessary FLUoxetine 40 mg capsule Commonly known as:  PROzac TAKE ONE CAPSULE BY MOUTH DAILY pravastatin 40 mg tablet Commonly known as:  PRAVACHOL Take 40 mg by mouth nightly. therapeutic multivitamin tablet Commonly known as:  [facility] Take 1 Tab by mouth daily. warfarin 1 mg tablet Commonly known as:  COUMADIN Take 4 mg by mouth five (5) days a week. Every day except Wednesday and Saturday takes 3 pills ZyrTEC 10 mg tablet Generic drug:  cetirizine Take 10 mg by mouth as needed. Patient Instructions Stop taking Prozac, have ECG repeated in 1 week to assess QTc. Introducing [facility]! Dear Eliezer Riedel: Thank you for requesting a [employer] account. Our records indicate that you have previously registered for a [employer] account but its currently inactive. Please call our [employer] support line at [phone]. Additional Information If you have questions, please visit the Frequently Asked Questions section of the [employer] website at [email or web address] [email or web address] Remember, [employer] is NOT to be used for urgent needs. For medical emergencies, dial 911. Now available from your iPhone and Android! Please provide this summary of care documentation to your next provider. Your primary care clinician is listed as ROBBIE MCGOVERN. If you have any questions after today's visit, please call [phone].

## 2017-02-09 NOTE — PROGRESS NOTES
Cardiac Electrophysiology Consultation Note     Subjective:      Jama Davenport is a 52 y.o. female patient who is seen for evaluation of ILR      {Choose one or more SmartLinks; press DELETE if none desired:[id number]}     Review of Systems:   Constitutional: Negative for fever, chills, weight loss, malaise/fatigue. HEENT: Negative for nosebleeds, vision changes. Respiratory: Negative for cough, hemoptysis, sputum production, and wheezing. Cardiovascular: Negative for chest pain, palpitations, orthopnea, claudication, leg swelling, syncope, and PND. Gastrointestinal: Negative for nausea, vomiting, diarrhea, constipation, blood in stool and melena. Genitourinary: Negative for dysuria, and hematuria. Musculoskeletal: Negative for myalgias, arthralgia. Skin: Negative for rash. Heme: Does not bleed or bruise easily. Neurological: Negative for speech change and focal weakness     Objective:     Visit Vitals    /62 (BP 1 Location: Left arm, BP Patient Position: Sitting)    Pulse 60    Ht 5' 3\" (1.6 m)    Wt 156 lb (70.8 kg)    BMI 27.63 kg/m2      Physical Exam:   Constitutional: well-developed and well-nourished. No distress. Head: Normocephalic and atraumatic. Eyes: Pupils are equal, round  Neck: supple. No JVD present. Cardiovascular: Normal rate, regular rhythm. Exam reveals no gallop and no friction rub. No murmur heard. Pulmonary/Chest: Effort normal and breath sounds normal. No wheezes. Abdominal: Soft, no tenderness. Musculoskeletal: no edema. Neurological: alert,oriented. Skin: Skin is warm and dry  Psychiatric: normal mood and affect. Behavior is normal. Judgment and thought content normal.      EKG: {ekg findings:[id number]::\"normal EKG, normal sinus rhythm\",\"unchanged from previous tracings\"}. Assessment/Plan:   {Assessment and Plan:15580}     Thank you for involving me in this patient's care and please call with further concerns or questions. Orbie Bamberger. Joshua Sanchez M.D.   Electrophysiology/Cardiology  [facility] [place]  [facility] [address] [place] [address]  [phone]

## 2017-02-16 ENCOUNTER — DOCUMENTATION ONLY (OUTPATIENT)
Dept: CARDIOLOGY CLINIC | Age: 47
End: 2017-02-16

## 2017-02-16 ENCOUNTER — CLINICAL SUPPORT (OUTPATIENT)
Dept: CARDIOLOGY CLINIC | Age: 47
End: 2017-02-16

## 2017-02-16 DIAGNOSIS — Z95.1 S/P CABG X 1: Primary | ICD-10-CM

## 2017-02-16 NOTE — PROGRESS NOTES
Came in for an EKG 1 week from stopping Prozac. Verified with patient that she has stopped her prozac since her last office visit.

## 2017-02-16 NOTE — PROGRESS NOTES
ECG today showed normal QTc   She has stopped prozac  She will follow up with Dr Judy Littlejohn and I will consider ILR if she has recurrent syncope

## 2017-02-16 NOTE — PROGRESS NOTES
ECG today showed normal QTc   She has stopped prozac  She will follow up with Dr Kike Campbell and I will consider ILR if she has recurrent syncope

## 2017-02-21 ENCOUNTER — OFFICE VISIT (OUTPATIENT)
Dept: FAMILY MEDICINE CLINIC | Age: 47
End: 2017-02-21

## 2017-02-21 VITALS
DIASTOLIC BLOOD PRESSURE: 64 MMHG | BODY MASS INDEX: 27.5 KG/M2 | OXYGEN SATURATION: 96 % | SYSTOLIC BLOOD PRESSURE: 110 MMHG | TEMPERATURE: 97.8 F | HEIGHT: 63 IN | RESPIRATION RATE: 18 BRPM | HEART RATE: 77 BPM | WEIGHT: 155.2 LBS

## 2017-02-21 DIAGNOSIS — H91.91: ICD-10-CM

## 2017-02-21 DIAGNOSIS — J31.0 CHRONIC RHINITIS: ICD-10-CM

## 2017-02-21 DIAGNOSIS — J01.90 ACUTE SINUSITIS, RECURRENCE NOT SPECIFIED, UNSPECIFIED LOCATION: Primary | ICD-10-CM

## 2017-02-21 RX ORDER — AMOXICILLIN AND CLAVULANATE POTASSIUM 875; 125 MG/1; MG/1
1 TABLET, FILM COATED ORAL 2 TIMES DAILY
Qty: 14 TAB | Refills: 0 | Status: SHIPPED | OUTPATIENT
Start: 2017-02-21 | End: 2017-02-28

## 2017-02-21 RX ORDER — ALBUTEROL SULFATE 90 UG/1
2 AEROSOL, METERED RESPIRATORY (INHALATION)
Qty: 1 INHALER | Refills: 1 | Status: SHIPPED | OUTPATIENT
Start: 2017-02-21 | End: 2017-05-23

## 2017-02-21 NOTE — PATIENT INSTRUCTIONS
Sinusitis: Care Instructions  Your Care Instructions    Sinusitis is an infection of the lining of the sinus cavities in your head. Sinusitis often follows a cold. It causes pain and pressure in your head and face. In most cases, sinusitis gets better on its own in 1 to 2 weeks. But some mild symptoms may last for several weeks. Sometimes antibiotics are needed. Follow-up care is a key part of your treatment and safety. Be sure to make and go to all appointments, and call your doctor if you are having problems. It's also a good idea to know your test results and keep a list of the medicines you take. How can you care for yourself at home? · Take an over-the-counter pain medicine, such as acetaminophen (Tylenol), ibuprofen (Advil, Motrin), or naproxen (Aleve). Read and follow all instructions on the label. · If the doctor prescribed antibiotics, take them as directed. Do not stop taking them just because you feel better. You need to take the full course of antibiotics. · Be careful when taking over-the-counter cold or flu medicines and Tylenol at the same time. Many of these medicines have acetaminophen, which is Tylenol. Read the labels to make sure that you are not taking more than the recommended dose. Too much acetaminophen (Tylenol) can be harmful. · Breathe warm, moist air from a steamy shower, a hot bath, or a sink filled with hot water. Avoid cold, dry air. Using a humidifier in your home may help. Follow the directions for cleaning the machine. · Use saline (saltwater) nasal washes to help keep your nasal passages open and wash out mucus and bacteria. You can buy saline nose drops at a grocery store or drugstore. Or you can make your own at home by adding 1 teaspoon of salt and 1 teaspoon of baking soda to 2 cups of distilled water. If you make your own, fill a bulb syringe with the solution, insert the tip into your nostril, and squeeze gently. Arianna Alberta your nose.   · Put a hot, wet towel or a warm gel pack on your face 3 or 4 times a day for 5 to 10 minutes each time. · Try a decongestant nasal spray like oxymetazoline (Afrin). Do not use it for more than 3 days in a row. Using it for more than 3 days can make your congestion worse. When should you call for help? Call your doctor now or seek immediate medical care if:  · You have new or worse swelling or redness in your face or around your eyes. · You have a new or higher fever. Watch closely for changes in your health, and be sure to contact your doctor if:  · You have new or worse facial pain. · The mucus from your nose becomes thicker (like pus) or has new blood in it. · You are not getting better as expected. Where can you learn more? Go to http://liya-valerio.info/. Enter E620 in the search box to learn more about \"Sinusitis: Care Instructions. \"  Current as of: July 29, 2016  Content Version: 11.1  © 20060919-4980 [employer]. Care instructions adapted under license by [employer] (which disclaims liability or warranty for this information). If you have questions about a medical condition or this instruction, always ask your healthcare professional. [place] [address] any warranty or liability for your use of this information.
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
## 2017-02-21 NOTE — MR AVS SNAPSHOT
Visit Information Date & Time Provider Department Dept. Phone Encounter #  
 2/21/2017 10:30 AM Yanelis Gallegok, [address] [phone] [id number] Your Appointments 3/2/2017  8:40 AM  
ESTABLISHED PATIENT with Marilyn Childers MD  
[facility] ([facility]) Appt Note: 3 month follow up; 3 mo f/u  
 [address] [place] [address]  
[address] [facility] [address]  
  
    
 3/2/2017  9:00 AM  
COUMADIN CLINIC with EFREM JULIEN [facility] ([facility] SCHEDULING) Appt Note: 1 month  
 [address] [place] [address]  
[address] [address] Upcoming Health Maintenance Date Due Pneumococcal 19-64 Medium Risk (1 of 1 - PPSV23) 1/7/1989 DTaP/Tdap/Td series (1 - Tdap) 1/7/1991 PAP AKA CERVICAL CYTOLOGY 1/7/1991 Allergies as of 2/21/2017  Review Complete On: 2/21/2017 By: Yanelis Moe MD  
  
 Severity Noted Reaction Type Reactions Contrast Dye [Iodine] High 03/02/2010   Systemic Anaphylaxis Lipitor [Atorvastatin]  03/21/2014    Myalgia And GI upset Percocet [Oxycodone-acetaminophen]  09/24/2015    Other (comments) Dizziness/fall Prozac [Fluoxetine]  02/21/2017    Other (comments) QT prolongation Sulfa (Sulfonamide Antibiotics)    Other (comments) Gi upset Tramadol  09/29/2016    Rash Vicoprofen [Hydrocodone-ibuprofen]  06/25/2014    Nausea and Vomiting Can take hydrocodone and ibuprofen separately, but not together Current Immunizations  Reviewed on 1/16/2017 Name Date Influenza Vaccine 11/13/2015 Influenza Vaccine Roselynn Kishore) 11/16/2016 Influenza Vaccine (Quad) PF 1/16/2017  1:10 PM  
 Influenza Vaccine Whole 11/23/2007 Not reviewed this visit You Were Diagnosed With   
  
 Codes Comments Acute sinusitis, recurrence not specified, unspecified location    -  Primary ICD-10-CM: J01.90 ICD-9-CM: 461.9 Chronic rhinitis     ICD-10-CM: J31.0 ICD-9-CM: 472.0 Hearing loss on right     ICD-10-CM: H91.91 
ICD-9-CM: 389. 9 Vitals BP Pulse Temp Resp Height(growth percentile) Weight(growth percentile) 110/64 (BP 1 Location: Left arm, BP Patient Position: Sitting) 77 97.8 °F (36.6 °C) (Oral) 18 5' 3\" (1.6 m) 155 lb 3.2 oz (70.4 kg) SpO2 BMI OB Status Smoking Status 96% 27.49 kg/m2 Hysterectomy Current Every Day Smoker Vitals History BMI and BSA Data Body Mass Index Body Surface Area  
 27.49 kg/m 2 1.77 m 2 Preferred Pharmacy Pharmacy Name Phone [id number] [phone] Your Updated Medication List  
  
   
This list is accurate as of: 2/21/17 11:10 AM.  Always use your most recent med list.  
  
  
  
  
 albuterol 90 mcg/actuation inhaler Commonly known as:  PROVENTIL HFA, VENTOLIN HFA, PROAIR HFA Take 2 Puffs by inhalation every four (4) hours as needed for Wheezing. amoxicillin-clavulanate 875-125 mg per tablet Commonly known as:  AUGMENTIN Take 1 Tab by mouth two (2) times a day for 7 days. aspirin 81 mg chewable tablet Take 1 Tab by mouth daily. docusate sodium 100 mg capsule Commonly known as:  Claudia Nancy Take 100 mg by mouth every other day. pravastatin 40 mg tablet Commonly known as:  PRAVACHOL Take 40 mg by mouth nightly. therapeutic multivitamin tablet Commonly known as:  [facility] Take 1 Tab by mouth daily. warfarin 1 mg tablet Commonly known as:  COUMADIN Take 4 mg by mouth five (5) days a week. Every day except Wednesday and Saturday takes 3 pills ZyrTEC 10 mg tablet Generic drug:  cetirizine Take 10 mg by mouth as needed. Prescriptions Sent to Pharmacy Refills amoxicillin-clavulanate (AUGMENTIN) 875-125 mg per tablet 0 Sig: Take 1 Tab by mouth two (2) times a day for 7 days. Class: Normal  
 Pharmacy: Merged with [facility] [address] [place] [address], [facility] [address]. Ph #: [phone] Route: Oral  
 albuterol (PROVENTIL HFA, VENTOLIN HFA, PROAIR HFA) 90 mcg/actuation inhaler 1 Sig: Take 2 Puffs by inhalation every four (4) hours as needed for Wheezing. Class: Normal  
 [facility] [address] [place] [address], [address]. Ph #: [phone] Route: Inhalation We Performed the Following REFERRAL TO ENT-OTOLARYNGOLOGY [QVK38 Custom] Comments:  
 Please evaluate patient for chronic problems with ears draining, hearing loss, needs follow up appointment. Referral Information Referral ID Referred By Referred To  
  
 [id number] [facility], [address] [place] [address], [address] [place] [address] [facility] Dee [address] [place] [address] Fax: [phone] Visits Status Start Date End Date 1 New Request 2/21/17 2/21/18 If your referral has a status of pending review or denied, additional information will be sent to support the outcome of this decision. Patient Instructions Sinusitis: Care Instructions Your Care Instructions Sinusitis is an infection of the lining of the sinus cavities in your head. Sinusitis often follows a cold. It causes pain and pressure in your head and face. In most cases, sinusitis gets better on its own in 1 to 2 weeks. But some mild symptoms may last for several weeks. Sometimes antibiotics are needed. Follow-up care is a key part of your treatment and safety. Be sure to make and go to all appointments, and call your doctor if you are having problems. It's also a good idea to know your test results and keep a list of the medicines you take. How can you care for yourself at home? · Take an over-the-counter pain medicine, such as acetaminophen (Tylenol), ibuprofen (Advil, Motrin), or naproxen (Aleve). Read and follow all instructions on the label. · If the doctor prescribed antibiotics, take them as directed. Do not stop taking them just because you feel better. You need to take the full course of antibiotics. · Be careful when taking over-the-counter cold or flu medicines and Tylenol at the same time. Many of these medicines have acetaminophen, which is Tylenol. Read the labels to make sure that you are not taking more than the recommended dose. Too much acetaminophen (Tylenol) can be harmful. · Breathe warm, moist air from a steamy shower, a hot bath, or a sink filled with hot water. Avoid cold, dry air. Using a humidifier in your home may help. Follow the directions for cleaning the machine. · Use saline (saltwater) nasal washes to help keep your nasal passages open and wash out mucus and bacteria. You can buy saline nose drops at a grocery store or drugstore. Or you can make your own at home by adding 1 teaspoon of salt and 1 teaspoon of baking soda to 2 cups of distilled water. If you make your own, fill a bulb syringe with the solution, insert the tip into your nostril, and squeeze gently. Hameed Bees your nose. · Put a hot, wet towel or a warm gel pack on your face 3 or 4 times a day for 5 to 10 minutes each time. · Try a decongestant nasal spray like oxymetazoline (Afrin). Do not use it for more than 3 days in a row. Using it for more than 3 days can make your congestion worse. When should you call for help? Call your doctor now or seek immediate medical care if: 
· You have new or worse swelling or redness in your face or around your eyes. · You have a new or higher fever. Watch closely for changes in your health, and be sure to contact your doctor if: 
· You have new or worse facial pain. · The mucus from your nose becomes thicker (like pus) or has new blood in it. · You are not getting better as expected. Where can you learn more? Go to http://liya-valerio.info/. Enter R069 in the search box to learn more about \"Sinusitis: Care Instructions. \" Current as of: July 29, 2016 Content Version: 11.1 © 7545-7316 [employer]. Care instructions adapted under license by [employer] (which disclaims liability or warranty for this information). If you have questions about a medical condition or this instruction, always ask your healthcare professional. [address] any warranty or liability for your use of this information. Introducing [facility]! Dear Jony Moreland: 
Thank you for requesting a [employer] account. Our records indicate that you have previously registered for a [employer] account but its currently inactive. Please call our [employer] support line at [phone]. Additional Information If you have questions, please visit the Frequently Asked Questions section of the [employer] website at [email or web address] Remember, [employer] is NOT to be used for urgent needs. For medical emergencies, dial 911. Now available from your iPhone and Android! Please provide this summary of care documentation to your next provider. Your primary care clinician is listed as ROBBIE MCGOVERN. If you have any questions after today's visit, please call [phone].

## 2017-02-23 ENCOUNTER — CLINICAL SUPPORT (OUTPATIENT)
Dept: CARDIOLOGY CLINIC | Age: 47
End: 2017-02-23

## 2017-02-23 DIAGNOSIS — I35.0 NONRHEUMATIC AORTIC VALVE STENOSIS: Primary | ICD-10-CM

## 2017-02-23 DIAGNOSIS — Z79.01 LONG TERM (CURRENT) USE OF ANTICOAGULANTS: ICD-10-CM

## 2017-02-23 LAB
INR BLD: NORMAL
INR, EXTERNAL: 1.9 (ref 2.5–3.5)
PT POC: NORMAL SEC
VALID INTERNAL CONTROL?: NORMAL

## 2017-02-23 NOTE — PROGRESS NOTES

## 2017-03-02 ENCOUNTER — CLINICAL SUPPORT (OUTPATIENT)
Dept: CARDIOLOGY CLINIC | Age: 47
End: 2017-03-02

## 2017-03-02 ENCOUNTER — OFFICE VISIT (OUTPATIENT)
Dept: CARDIOLOGY CLINIC | Age: 47
End: 2017-03-02

## 2017-03-02 VITALS
DIASTOLIC BLOOD PRESSURE: 58 MMHG | HEART RATE: 76 BPM | RESPIRATION RATE: 16 BRPM | WEIGHT: 157.6 LBS | SYSTOLIC BLOOD PRESSURE: 100 MMHG | OXYGEN SATURATION: 97 % | HEIGHT: 63 IN | BODY MASS INDEX: 27.93 KG/M2

## 2017-03-02 DIAGNOSIS — R55 SYNCOPE, UNSPECIFIED SYNCOPE TYPE: Primary | ICD-10-CM

## 2017-03-02 DIAGNOSIS — Z95.2 S/P AVR (AORTIC VALVE REPLACEMENT): Primary | ICD-10-CM

## 2017-03-02 DIAGNOSIS — I77.9 CAROTID ARTERY DISEASE WITHOUT CEREBRAL INFARCTION (HCC): ICD-10-CM

## 2017-03-02 DIAGNOSIS — I35.0 NONRHEUMATIC AORTIC VALVE STENOSIS: ICD-10-CM

## 2017-03-02 DIAGNOSIS — E78.00 HYPERCHOLESTEREMIA: ICD-10-CM

## 2017-03-02 DIAGNOSIS — F41.9 ANXIETY: ICD-10-CM

## 2017-03-02 DIAGNOSIS — Z79.01 LONG TERM (CURRENT) USE OF ANTICOAGULANTS: ICD-10-CM

## 2017-03-02 LAB
INR BLD: NORMAL
PT POC: NORMAL SEC
VALID INTERNAL CONTROL?: YES

## 2017-03-02 RX ORDER — ESCITALOPRAM OXALATE 10 MG/1
10 TABLET ORAL DAILY
Qty: 30 TAB | Refills: 2 | Status: SHIPPED | OUTPATIENT
Start: 2017-03-02 | End: 2017-04-25

## 2017-03-02 RX ORDER — PRAVASTATIN SODIUM 40 MG/1
40 TABLET ORAL
Qty: 90 TAB | Refills: 3 | Status: SHIPPED | OUTPATIENT
Start: 2017-03-02 | End: 2017-05-23

## 2017-03-02 RX ORDER — ESCITALOPRAM OXALATE 10 MG/1
10 TABLET ORAL DAILY
COMMUNITY
End: 2017-03-02 | Stop reason: SDUPTHER

## 2017-03-02 RX ORDER — PRAVASTATIN SODIUM 40 MG/1
40 TABLET ORAL
Qty: 90 TAB | Refills: 3 | Status: SHIPPED | OUTPATIENT
Start: 2017-03-02 | End: 2017-03-02 | Stop reason: SDUPTHER

## 2017-03-02 NOTE — MR AVS SNAPSHOT
Visit Information Date & Time Provider Department Dept. Phone Encounter #  
 3/2/2017  8:40 AM Gissell Morse MD [facility] [phone] [id number] Follow-up Instructions Return in about 3 months (around 6/2/2017). Your Appointments 3/13/2017  1:00 PM  
EKG with Gissell Morse MD  
[facility] ([address]) Appt Note: 10 day follow up EKG  
 [address] [facility] [address] [place] [address]  
[address] [facility]  
  
    
 6/1/2017  9:00 AM  
ESTABLISHED PATIENT with Gissell Morse MD  
[facility] ([address]) Appt Note: 3 month follow up  
 [address] [place] [address]  
[address] [address] Upcoming Health Maintenance Date Due Pneumococcal 19-64 Medium Risk (1 of 1 - PPSV23) 1/7/1989 DTaP/Tdap/Td series (1 - Tdap) 1/7/1991 PAP AKA CERVICAL CYTOLOGY 1/7/1991 Allergies as of 3/2/2017  Review Complete On: 3/2/2017 By: Gissell Morse MD  
  
 Severity Noted Reaction Type Reactions Contrast Dye [Iodine] High 03/02/2010   Systemic Anaphylaxis Lipitor [Atorvastatin]  03/21/2014    Myalgia And GI upset Percocet [Oxycodone-acetaminophen]  09/24/2015    Other (comments) Dizziness/fall Prozac [Fluoxetine]  02/21/2017    Other (comments) QT prolongation Sulfa (Sulfonamide Antibiotics)    Other (comments) Gi upset Tramadol  09/29/2016    Rash Vicoprofen [Hydrocodone-ibuprofen]  06/25/2014    Nausea and Vomiting Can take hydrocodone and ibuprofen separately, but not together Current Immunizations  Reviewed on 1/16/2017 Name Date Influenza Vaccine 11/13/2015 Influenza Vaccine Katiana Melissa) 11/16/2016 Influenza Vaccine (Quad) PF 1/16/2017  1:10 PM  
 Influenza Vaccine Whole 11/23/2007 Not reviewed this visit You Were Diagnosed With   
  
 Codes Comments Syncope, unspecified syncope type    -  Primary ICD-10-CM: R55 
ICD-9-CM: 780.2 Anxiety     ICD-10-CM: F41.9 ICD-9-CM: 300.00 Nonrheumatic aortic valve stenosis     ICD-10-CM: I35.0 ICD-9-CM: 424.1 Hypercholesteremia     ICD-10-CM: E78.00 ICD-9-CM: 272.0 Carotid artery disease without cerebral infarction [facility])     ICD-10-CM: I77.9 ICD-9-CM: 966. 9 Vitals BP  
  
  
  
  
  
 100/58 (BP 1 Location: Left arm, BP Patient Position: Sitting) BMI and BSA Data Body Mass Index Body Surface Area  
 27.92 kg/m 2 1.78 m 2 Preferred Pharmacy Pharmacy Name Phone Vandana Agarwal [address] [place] [phone] Your Updated Medication List  
  
   
This list is accurate as of: 3/2/17  9:26 AM.  Always use your most recent med list.  
  
  
  
  
 albuterol 90 mcg/actuation inhaler Commonly known as:  PROVENTIL HFA, VENTOLIN HFA, PROAIR HFA Take 2 Puffs by inhalation every four (4) hours as needed for Wheezing. aspirin 81 mg chewable tablet Take 1 Tab by mouth daily. CIPRO HC otic suspension Generic drug:  ciprofloxacin-hydrocortisone 3 Drops two (2) times a day. docusate sodium 100 mg capsule Commonly known as:  Demorest García Take 100 mg by mouth every other day. escitalopram oxalate 10 mg tablet Commonly known as:  Wyn Keily Take 1 Tab by mouth daily. pravastatin 40 mg tablet Commonly known as:  PRAVACHOL Take 1 Tab by mouth nightly. therapeutic multivitamin tablet Commonly known as:  [facility] Take 1 Tab by mouth daily. warfarin 1 mg tablet Commonly known as:  COUMADIN Take 4 mg by mouth five (5) days a week. Every day except Wednesday and Saturday takes 3 pills ZyrTEC 10 mg tablet Generic drug:  cetirizine Take 10 mg by mouth as needed. Prescriptions Sent to Pharmacy Refills  
 pravastatin (PRAVACHOL) 40 mg tablet 3 Sig: Take 1 Tab by mouth nightly. Class: Normal  
 Pharmacy: [facility] [address] [facility] Ph #: [phone] Route: Oral  
 escitalopram oxalate (LEXAPRO) 10 mg tablet 2 Sig: Take 1 Tab by mouth daily. Class: Normal  
 Pharmacy: [facility] [address] [facility], [address] Ph #: [phone] Route: Oral  
  
Follow-up Instructions Return in about 3 months (around 6/2/2017). Introducing [facility] SERVICES! Dear Antonia Davidson: 
Thank you for requesting a [employer] account. Our records indicate that you have previously registered for a [employer] account but its currently inactive. Please call our [employer] support line at [phone]. Additional Information If you have questions, please visit the Frequently Asked Questions section of the [email or web address] website at [email or web address] Remember, [email or web address] is NOT to be used for urgent needs. For medical emergencies, dial 911. Now available from your iPhone and Android! Please provide this summary of care documentation to your next provider. Your primary care clinician is listed as ROBBIE MCGOVERN. If you have any questions after today's visit, please call [phone].

## 2017-03-02 NOTE — PROGRESS NOTES
Chief Complaint   Patient presents with    Heart Murmur     3 month follow up. States \"heart musle pain\"  \"comes and goes\". Complaints of dypsnea on exertion. Denies swelling.  Aortic Stenosis     Concerned with QT prolongation. States Dr. Cornelious Felty discontinued prozac. Concerned with not taking serotonin. Has been on serotonin since age 25. Recently completed cipro for ear infection. States has had daily headache since discontinuing prozac.

## 2017-03-02 NOTE — PROGRESS NOTES
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
## 2017-03-06 ENCOUNTER — CLINICAL SUPPORT (OUTPATIENT)
Dept: CARDIOLOGY CLINIC | Age: 47
End: 2017-03-06

## 2017-03-06 DIAGNOSIS — Z95.2 S/P AVR (AORTIC VALVE REPLACEMENT): Primary | ICD-10-CM

## 2017-03-06 DIAGNOSIS — Z79.01 LONG TERM (CURRENT) USE OF ANTICOAGULANTS: ICD-10-CM

## 2017-03-06 LAB
INR BLD: NORMAL
INR, EXTERNAL: 2.6 (ref 2.5–3.5)
PT POC: NORMAL SEC
VALID INTERNAL CONTROL?: YES

## 2017-03-13 ENCOUNTER — CLINICAL SUPPORT (OUTPATIENT)
Dept: CARDIOLOGY CLINIC | Age: 47
End: 2017-03-13

## 2017-03-13 DIAGNOSIS — I34.1 MVP (MITRAL VALVE PROLAPSE): ICD-10-CM

## 2017-03-13 DIAGNOSIS — E78.00 HYPERCHOLESTEREMIA: Primary | Chronic | ICD-10-CM

## 2017-03-13 DIAGNOSIS — Z79.01 LONG TERM (CURRENT) USE OF ANTICOAGULANTS: ICD-10-CM

## 2017-03-13 DIAGNOSIS — Z95.2 S/P AVR (AORTIC VALVE REPLACEMENT): Primary | ICD-10-CM

## 2017-03-13 DIAGNOSIS — I35.0 AORTIC VALVE STENOSIS, UNSPECIFIED ETIOLOGY: ICD-10-CM

## 2017-03-13 LAB
INR BLD: NORMAL
INR, EXTERNAL: 2.8 (ref 2.5–3.5)
PT POC: NORMAL SEC
VALID INTERNAL CONTROL?: YES

## 2017-03-22 RX ORDER — WARFARIN 1 MG/1
4 TABLET ORAL
Qty: 110 TAB | Refills: 3 | Status: SHIPPED | OUTPATIENT
Start: 2017-03-22 | End: 2017-04-01

## 2017-03-30 ENCOUNTER — TELEPHONE (OUTPATIENT)
Dept: FAMILY MEDICINE CLINIC | Age: 47
End: 2017-03-30

## 2017-03-30 NOTE — TELEPHONE ENCOUNTER
Referral Request Telephone Call      Insurance Name:     Naomie Anthony [facility])    Insurance ID:   [id number]   Specialist Name: Dr. Suze Churchill   Type of Specialty:  Cardiologist    Address of Specialist:  [address], [address], [place], [place]   Phone/Fax Number of Specialist: Phone: [phone]  Fax: [phone]   Diagnosis: Coumadin injections/heart attach follow up   Appointment Date: 04/13/17 Coumadin   06/01/17 Follow up   NPI    Tax ID                  Patient states that she needs referrals for coumadin.

## 2017-04-01 ENCOUNTER — HOSPITAL ENCOUNTER (EMERGENCY)
Age: 47
Discharge: HOME | End: 2017-04-01
Attending: EMERGENCY MEDICINE
Payer: COMMERCIAL

## 2017-04-01 ENCOUNTER — APPOINTMENT (OUTPATIENT)
Dept: CT IMAGING | Age: 47
End: 2017-04-01
Attending: EMERGENCY MEDICINE
Payer: COMMERCIAL

## 2017-04-01 VITALS
RESPIRATION RATE: 14 BRPM | OXYGEN SATURATION: 99 % | WEIGHT: 158.8 LBS | BODY MASS INDEX: 28.14 KG/M2 | TEMPERATURE: 97.7 F | HEIGHT: 63 IN | DIASTOLIC BLOOD PRESSURE: 53 MMHG | SYSTOLIC BLOOD PRESSURE: 96 MMHG | HEART RATE: 56 BPM

## 2017-04-01 DIAGNOSIS — S09.90XA MINOR HEAD INJURY, INITIAL ENCOUNTER: Primary | ICD-10-CM

## 2017-04-01 DIAGNOSIS — S00.83XA HEMATOMA OF FACE, INITIAL ENCOUNTER: ICD-10-CM

## 2017-04-01 DIAGNOSIS — H66.91 RIGHT OTITIS MEDIA, UNSPECIFIED CHRONICITY, UNSPECIFIED OTITIS MEDIA TYPE: ICD-10-CM

## 2017-04-01 LAB — INR BLD: 2.2 (ref 0.9–1.2)

## 2017-04-01 PROCEDURE — 70450 CT HEAD/BRAIN W/O DYE: CPT

## 2017-04-01 PROCEDURE — 99283 EMERGENCY DEPT VISIT LOW MDM: CPT

## 2017-04-01 PROCEDURE — 85610 PROTHROMBIN TIME: CPT

## 2017-04-01 RX ORDER — WARFARIN 1 MG/1
1 TABLET ORAL DAILY
COMMUNITY
End: 2017-08-21

## 2017-04-01 RX ORDER — AMOXICILLIN AND CLAVULANATE POTASSIUM 875; 125 MG/1; MG/1
1 TABLET, FILM COATED ORAL 2 TIMES DAILY
Qty: 10 TAB | Refills: 0 | Status: SHIPPED | OUTPATIENT
Start: 2017-04-01 | End: 2017-04-25 | Stop reason: ALTCHOICE

## 2017-04-01 RX ORDER — HYDROCODONE BITARTRATE AND ACETAMINOPHEN 5; 325 MG/1; MG/1
1 TABLET ORAL
Qty: 5 TAB | Refills: 0 | Status: SHIPPED | OUTPATIENT
Start: 2017-04-01 | End: 2017-05-23

## 2017-04-01 RX ORDER — OFLOXACIN 3 MG/ML
5 SOLUTION AURICULAR (OTIC) DAILY
Qty: 5 ML | Refills: 0 | Status: SHIPPED | OUTPATIENT
Start: 2017-04-01 | End: 2017-05-05 | Stop reason: ALTCHOICE

## 2017-04-01 RX ORDER — WARFARIN 1 MG/1
4 TABLET ORAL
COMMUNITY
End: 2017-04-18 | Stop reason: SDUPTHER

## 2017-04-01 NOTE — PROGRESS NOTES
Prior to Admission Medications   Prescriptions Last Dose Informant Patient Reported? Taking? albuterol (PROVENTIL HFA, VENTOLIN HFA, PROAIR HFA) 90 mcg/actuation inhaler Not Taking at Unknown time  No No   Sig: Take 2 Puffs by inhalation every four (4) hours as needed for Wheezing. aspirin 81 mg chewable tablet 3/25/2017 at Unknown time  No Yes   Sig: Take 1 Tab by mouth daily. cetirizine (ZYRTEC) 10 mg tablet 3/25/2017 at Unknown time  Yes Yes   Sig: Take 10 mg by mouth as needed. escitalopram oxalate (LEXAPRO) 10 mg tablet 3/31/2017 at 2100  No Yes   Sig: Take 1 Tab by mouth daily. pravastatin (PRAVACHOL) 40 mg tablet 3/31/2017 at 2100  No Yes   Sig: Take 1 Tab by mouth nightly. therapeutic multivitamin (THERAGRAN) tablet 3/1/2017 at Unknown time  Yes Yes   Sig: Take 1 Tab by mouth daily. warfarin (COUMADIN) 1 mg tablet 3/31/2017 at 2100  Yes Yes   Sig: Take 4 mg by mouth five (5) days a week. Except Wednesday and Saturday   warfarin (COUMADIN) 1 mg tablet 3/25/2017 at Unknown time  Yes Yes   Sig: Take 1 mg by mouth daily. Wednesday and Saturday      Facility-Administered Medications: None   Self: List verified per patient interview. Coumadin entry clarified.

## 2017-04-01 NOTE — DISCHARGE INSTRUCTIONS
We hope that we have addressed all of your medical concerns. The examination and treatment you received in the Emergency Department were for an emergent problem and were not intended as complete care. It is important that you follow up with your healthcare provider(s) for ongoing care. If your symptoms worsen or do not improve as expected, and you are unable to reach your usual health care provider(s), you should return to the Emergency Department. Today's healthcare is undergoing tremendous change, and patient satisfaction surveys are one of the many tools to assess the quality of medical care. You may receive a survey from the [facility] regarding your experience in the Emergency Department. I hope that your experience has been completely positive, particularly the medical care that I provided. As such, please participate in the survey; anything less than excellent does not meet my expectations or intentions. [facility] and [address] [place] [address] participate in nationally recognized quality of care measures. If your blood pressure is greater than 120/80, as reported below, we urge that you seek medical care to address the potential of high blood pressure, commonly known as hypertension. Hypertension can be hereditary or can be caused by certain medical conditions, pain, stress, or \"white coat syndrome. \"       Please make an appointment with your health care provider(s) for follow up of your Emergency Department visit. VITALS:   Patient Vitals for the past 8 hrs:   Temp Pulse Resp BP SpO2   04/01/17 1110 97.4 °F (36.3 °C) 79 16 116/66 100 %          Thank you for allowing us to provide you with medical care today. We realize that you have many choices for your emergency care needs. Please choose us in the future for any continued health care needs. Margarito Villalobos, [address] [place] [address] [address]   Office: [phone]            Recent Results (from the past 24 hour(s))   POC INR    Collection Time: 04/01/17 12:11 PM   Result Value Ref Range    INR (POC) 2.2 (H) <1.2         Ct Head Wo Cont    Result Date: 4/1/2017  EXAM:  CT HEAD WO CONT INDICATION:   fall/taking blood thinner Additional history: Patient struck head on window sill prior to arrival. Patient on warfarin. COMPARISON: CT of the head, 1/13/2017. TECHNIQUE: Unenhanced CT of the head was performed using 5 mm images. Brain and bone windows were generated. CT dose reduction was achieved through use of a standardized protocol tailored for this examination and automatic exposure control for dose modulation. FINDINGS: The ventricles and sulci are normal in size, shape and configuration and midline. There is no significant white matter disease. There is no intracranial hemorrhage, extra-axial collection, mass, mass effect or midline shift. The basilar cisterns are open. No acute infarct is identified. The bone windows demonstrate no abnormalities. The visualized portions of the paranasal sinuses and mastoid air cells are clear. IMPRESSION: 1. No evidence of acute intracranial abnormality by this modality. Head Injury: Care Instructions  Your Care Instructions  Most injuries to the head are minor. Bumps, cuts, and scrapes on the head and face usually heal well and can be treated the same as injuries to other parts of the body. Although it's rare, once in a while a more serious problem shows up after you are home. So it's good to be on the lookout for symptoms for a day or two. Follow-up care is a key part of your treatment and safety. Be sure to make and go to all appointments, and call your doctor if you are having problems. It's also a good idea to know your test results and keep a list of the medicines you take. How can you care for yourself at home? · Follow your doctor's instructions.  He or she will tell you if you need someone to watch you closely for the next 24 hours or longer. · Take it easy for the next few days or more if you are not feeling well. · Ask your doctor when it's okay for you to go back to activities like driving a car, riding a bike, or operating machinery. When should you call for help? Call 911 anytime you think you may need emergency care. For example, call if:  · You have a seizure. · You passed out (lost consciousness). · You are confused or can't stay awake. Call your doctor now or seek immediate medical care if:  · You have new or worse vomiting. · You feel less alert. · You have new weakness or numbness in any part of your body. Watch closely for changes in your health, and be sure to contact your doctor if:  · You do not get better as expected. · You have new symptoms, such as headaches, trouble concentrating, or changes in mood. Where can you learn more? Go to http://liyaAcumenvalerio.info/. Enter Z830 in the search box to learn more about \"Head Injury: Care Instructions. \"  Current as of: October 14, 2016  Content Version: 11.2  © 1134-8123 [employer]. Care instructions adapted under license by [employer] (which disclaims liability or warranty for this information). If you have questions about a medical condition or this instruction, always ask your healthcare professional. [address] any warranty or liability for your use of this information. Ear Infection (Otitis Media): Care Instructions  Your Care Instructions    An ear infection may start with a cold and affect the middle ear (otitis media). It can hurt a lot. Most ear infections clear up on their own in a couple of days. Most often you will not need antibiotics. This is because many ear infections are caused by a virus. Antibiotics don't work against a virus. Regular doses of pain medicines are the best way to reduce your fever and help you feel better.   Follow-up care is a key part of your treatment and safety. Be sure to make and go to all appointments, and call your doctor if you are having problems. It's also a good idea to know your test results and keep a list of the medicines you take. How can you care for yourself at home? · Take pain medicines exactly as directed. ¨ If the doctor gave you a prescription medicine for pain, take it as prescribed. ¨ If you are not taking a prescription pain medicine, take an over-the-counter medicine, such as acetaminophen (Tylenol), ibuprofen (Advil, Motrin), or naproxen (Aleve). Read and follow all instructions on the label. ¨ Do not take two or more pain medicines at the same time unless the doctor told you to. Many pain medicines have acetaminophen, which is Tylenol. Too much acetaminophen (Tylenol) can be harmful. · Plan to take a full dose of pain reliever before bedtime. Getting enough sleep will help you get better. · Try a warm, moist washcloth on the ear. It may help relieve pain. · If your doctor prescribed antibiotics, take them as directed. Do not stop taking them just because you feel better. You need to take the full course of antibiotics. When should you call for help? Call your doctor now or seek immediate medical care if:  · You have new or increasing ear pain. · You have new or increasing pus or blood draining from your ear. · You have a fever with a stiff neck or a severe headache. Watch closely for changes in your health, and be sure to contact your doctor if:  · You have new or worse symptoms. · You are not getting better after taking an antibiotic for 2 days. Where can you learn more? Go to http://liya-valerio.info/. Enter W299 in the search box to learn more about \"Ear Infection (Otitis Media): Care Instructions. \"  Current as of: July 29, 2016  Content Version: 11.2  © 7309-5554 [employer].  Care instructions adapted under license by [employer] (which disclaims liability or warranty for this information). If you have questions about a medical condition or this instruction, always ask your healthcare professional. [place] [address] any warranty or liability for your use of this information.
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
## 2017-04-01 NOTE — ED TRIAGE NOTES
TRIAGE NOTE: \"I was shaving my legs and forgot to but a towel down. Slipped and fell, hitting my head on the window sill. \" Knot to right temple. Patient takes warfarin. Also reports \"severe right ear infection\" with ear drum rupture and \"leaking snot\".

## 2017-04-03 ENCOUNTER — TELEPHONE (OUTPATIENT)
Dept: CARDIOLOGY CLINIC | Age: 47
End: 2017-04-03

## 2017-04-03 NOTE — TELEPHONE ENCOUNTER
The patient stated that she recently was in the hospital and her INR was out of range, she stated that it was low and is now on an antibiotic. She requested a call back on [phone]. Thanks!

## 2017-04-05 ENCOUNTER — CLINICAL SUPPORT (OUTPATIENT)
Dept: CARDIOLOGY CLINIC | Age: 47
End: 2017-04-05

## 2017-04-05 DIAGNOSIS — Z79.01 LONG TERM (CURRENT) USE OF ANTICOAGULANTS: ICD-10-CM

## 2017-04-05 DIAGNOSIS — Z95.2 S/P AVR (AORTIC VALVE REPLACEMENT): Primary | ICD-10-CM

## 2017-04-05 LAB
INR BLD: NORMAL
INR, EXTERNAL: 2.5 (ref 2.5–3.5)
PT POC: NORMAL SEC
VALID INTERNAL CONTROL?: YES

## 2017-04-18 RX ORDER — WARFARIN 1 MG/1
TABLET ORAL
Qty: 125 TAB | Refills: 2 | Status: SHIPPED | OUTPATIENT
Start: 2017-04-18 | End: 2017-05-05 | Stop reason: SDUPTHER

## 2017-04-25 ENCOUNTER — OFFICE VISIT (OUTPATIENT)
Dept: FAMILY MEDICINE CLINIC | Age: 47
End: 2017-04-25

## 2017-04-25 VITALS
SYSTOLIC BLOOD PRESSURE: 110 MMHG | RESPIRATION RATE: 18 BRPM | HEIGHT: 63 IN | DIASTOLIC BLOOD PRESSURE: 60 MMHG | TEMPERATURE: 99.9 F | HEART RATE: 64 BPM | WEIGHT: 157 LBS | BODY MASS INDEX: 27.82 KG/M2 | OXYGEN SATURATION: 97 %

## 2017-04-25 DIAGNOSIS — I45.81 LONG Q-T SYNDROME: ICD-10-CM

## 2017-04-25 DIAGNOSIS — J01.10 ACUTE NON-RECURRENT FRONTAL SINUSITIS: Primary | ICD-10-CM

## 2017-04-25 DIAGNOSIS — F43.10 PTSD (POST-TRAUMATIC STRESS DISORDER): ICD-10-CM

## 2017-04-25 RX ORDER — VENLAFAXINE HYDROCHLORIDE 37.5 MG/1
37.5 CAPSULE, EXTENDED RELEASE ORAL DAILY
Qty: 30 CAP | Refills: 1 | Status: SHIPPED | OUTPATIENT
Start: 2017-04-25 | End: 2017-05-05 | Stop reason: SDUPTHER

## 2017-04-25 NOTE — MR AVS SNAPSHOT
Visit Information Date & Time Provider Department Dept. Phone Encounter #  
 4/25/2017  4:30 PM Brock Peacock, [address] [phone] [id number] Follow-up Instructions Return in about 10 days (around 5/5/2017) for ekg, pap, preventive. Your Appointments 5/5/2017  9:00 AM  
COUMADIN CLINIC with [facility] ([facility] SCHEDULING) Appt Note: one month follow up; 1 month  
 [address]  
[address] [facility]  
  
    
 6/1/2017  9:00 AM  
ESTABLISHED PATIENT with Jeffery Collins MD  
[facility] ([facility]) Appt Note: 3 month follow up  
 [address]  
[address] [phone] Upcoming Health Maintenance Date Due Pneumococcal 19-64 Medium Risk (1 of 1 - PPSV23) 1/7/1989 DTaP/Tdap/Td series (1 - Tdap) 1/7/1991 PAP AKA CERVICAL CYTOLOGY 4/25/2020 Allergies as of 4/25/2017  Review Complete On: 4/25/2017 By: Brock Peacock MD  
  
 Severity Noted Reaction Type Reactions Contrast Dye [Iodine] High 03/02/2010   Systemic Anaphylaxis Lipitor [Atorvastatin]  03/21/2014    Myalgia And GI upset Percocet [Oxycodone-acetaminophen]  09/24/2015    Other (comments) Dizziness/fall Prozac [Fluoxetine]  02/21/2017    Other (comments) QT prolongation Sulfa (Sulfonamide Antibiotics)    Other (comments) Gi upset Tramadol  09/29/2016    Rash Vicoprofen [Hydrocodone-ibuprofen]  06/25/2014    Nausea and Vomiting Can take hydrocodone and ibuprofen separately, but not together Current Immunizations  Reviewed on 1/16/2017 Name Date Influenza Vaccine 11/13/2015 Influenza Vaccine Misagoldie Nicoleton) 11/16/2016  Influenza Vaccine (Quad) PF 1/16/2017  1:10 PM  
 Influenza Vaccine Whole 11/23/2007 Not reviewed this visit You Were Diagnosed With   
  
 Codes Comments Acute non-recurrent frontal sinusitis    -  Primary ICD-10-CM: J01.10 ICD-9-CM: 504.8 PTSD (post-traumatic stress disorder)     ICD-10-CM: F43.10 ICD-9-CM: 309.81 Vitals BP Pulse Temp Resp Height(growth percentile) Weight(growth percentile) 110/60 (BP 1 Location: Right arm, BP Patient Position: Sitting) 64 99.9 °F (37.7 °C) (Oral) 18 5' 3\" (1.6 m) 157 lb (71.2 kg) SpO2 BMI OB Status Smoking Status 97% 27.81 kg/m2 Hysterectomy Current Every Day Smoker Vitals History BMI and BSA Data Body Mass Index Body Surface Area  
 27.81 kg/m 2 1.78 m 2 Preferred Pharmacy Pharmacy Name Phone [address] [place]. [phone] Your Updated Medication List  
  
   
This list is accurate as of: 4/25/17  5:12 PM.  Always use your most recent med list.  
  
  
  
  
 albuterol 90 mcg/actuation inhaler Commonly known as:  PROVENTIL HFA, VENTOLIN HFA, PROAIR HFA Take 2 Puffs by inhalation every four (4) hours as needed for Wheezing. aspirin 81 mg chewable tablet Take 1 Tab by mouth daily. * COUMADIN 1 mg tablet Generic drug:  warfarin Take 1 mg by mouth daily. Wednesday and Saturday * warfarin 1 mg tablet Commonly known as:  COUMADIN  
4 tablets daily except 5 tablets on Wednesday and Saturday. HYDROcodone-acetaminophen 5-325 mg per tablet Commonly known as:  Marty Weiner Take 1 Tab by mouth every four (4) hours as needed for Pain. Max Daily Amount: 6 Tabs. ofloxacin 0.3 % otic solution Commonly known as:  FLOXIN Administer 5 Drops in right ear daily. pravastatin 40 mg tablet Commonly known as:  PRAVACHOL Take 1 Tab by mouth nightly. therapeutic multivitamin tablet Commonly known as:  [facility] Take 1 Tab by mouth daily. venlafaxine-SR 37.5 mg capsule Commonly known as:  EFFEXOR-XR Take 1 Cap by mouth daily. ZyrTEC 10 mg tablet Generic drug:  cetirizine Take 10 mg by mouth as needed. * Notice: This list has 2 medication(s) that are the same as other medications prescribed for you. Read the directions carefully, and ask your doctor or other care provider to review them with you. Prescriptions Sent to Pharmacy Refills  
 venlafaxine-SR (EFFEXOR-XR) 37.5 mg capsule 1 Sig: Take 1 Cap by mouth daily. Class: Normal  
 Pharmacy: [facility] [address] [place] [address], [address]  #: [phone] Route: Oral  
  
Follow-up Instructions Return in about 10 days (around 5/5/2017) for ekg, pap, preventive. Patient Instructions Alternate effexor (Wed, Fri, Sun, Tues) with lexapro Lacey Fernie, [facility] then after next Tuesday, only effexor Let me see you back next Friday and we will do an EKG Sinusitis: Care Instructions Your Care Instructions Sinusitis is an infection of the lining of the sinus cavities in your head. Sinusitis often follows a cold. It causes pain and pressure in your head and face. In most cases, sinusitis gets better on its own in 1 to 2 weeks. But some mild symptoms may last for several weeks. Sometimes antibiotics are needed. Follow-up care is a key part of your treatment and safety. Be sure to make and go to all appointments, and call your doctor if you are having problems. It's also a good idea to know your test results and keep a list of the medicines you take. How can you care for yourself at home? · Take an over-the-counter pain medicine, such as acetaminophen (Tylenol), ibuprofen (Advil, Motrin), or naproxen (Aleve). Read and follow all instructions on the label. · If the doctor prescribed antibiotics, take them as directed.  Do not stop taking them just because you feel better. You need to take the full course of antibiotics. · Be careful when taking over-the-counter cold or flu medicines and Tylenol at the same time. Many of these medicines have acetaminophen, which is Tylenol. Read the labels to make sure that you are not taking more than the recommended dose. Too much acetaminophen (Tylenol) can be harmful. · Breathe warm, moist air from a steamy shower, a hot bath, or a sink filled with hot water. Avoid cold, dry air. Using a humidifier in your home may help. Follow the directions for cleaning the machine. · Use saline (saltwater) nasal washes to help keep your nasal passages open and wash out mucus and bacteria. You can buy saline nose drops at a grocery store or drugstore. Or you can make your own at home by adding 1 teaspoon of salt and 1 teaspoon of baking soda to 2 cups of distilled water. If you make your own, fill a bulb syringe with the solution, insert the tip into your nostril, and squeeze gently. Nathalie Mindoro your nose. · Put a hot, wet towel or a warm gel pack on your face 3 or 4 times a day for 5 to 10 minutes each time. · Try a decongestant nasal spray like oxymetazoline (Afrin). Do not use it for more than 3 days in a row. Using it for more than 3 days can make your congestion worse. When should you call for help? Call your doctor now or seek immediate medical care if: 
· You have new or worse swelling or redness in your face or around your eyes. · You have a new or higher fever. Watch closely for changes in your health, and be sure to contact your doctor if: 
· You have new or worse facial pain. · The mucus from your nose becomes thicker (like pus) or has new blood in it. · You are not getting better as expected. Where can you learn more? Go to http://liya-valerio.info/. Enter N826 in the search box to learn more about \"Sinusitis: Care Instructions. \" Current as of: July 29, 2016 Content Version: 11.2 © 1656-9765 Healthwise[employer] Incorporated. Care instructions adapted under license by [employer] (which disclaims liability or warranty for this information). If you have questions about a medical condition or this instruction, always ask your healthcare professional. [address] any warranty or liability for your use of this information. Introducing [facility]! Dear Sally Lynch: 
Thank you for requesting a [employer] account. Our records indicate that you have previously registered for a [employer] account but its currently inactive. Please call our [employer] support line at [phone]. Additional Information If you have questions, please visit the Frequently Asked Questions section of the [employer] website at [email or web address] Remember, [employer] is NOT to be used for urgent needs. For medical emergencies, dial 911. Now available from your iPhone and Android! Please provide this summary of care documentation to your next provider. Your primary care clinician is listed as ROBBIE MCGOVERN. If you have any questions after today's visit, please call [phone].

## 2017-04-25 NOTE — PROGRESS NOTES
Chief Complaint   Patient presents with    Depression     follow up    Ear Pain     right ear pain couple days, ? infection , also sinus headcahe       Reviewed Record in preparation for visit and have obtained necessary documentation. Identified pt with two pt identifiers (Name @ )    Health Maintenance Due   Topic    Pneumococcal 19-64 Medium Risk (1 of 1 - PPSV23)    DTaP/Tdap/Td series (1 - Tdap)    PAP AKA CERVICAL CYTOLOGY          1. Have you been to the ER, urgent care clinic since your last visit? Hospitalized since your last visit? Went to [facility] 1 month ago for fall and hit head. 2. Have you seen or consulted any other health care providers outside of the [facility] [place] [address] [place] since your last visit? Include any pap smears or colon screening.  No

## 2017-04-25 NOTE — PATIENT INSTRUCTIONS
Alternate effexor (Wed, Fri, Sun, Tues) with lexapro Bustos Dakin, Sat, [place]), then after next Tuesday, only effexor  Let me see you back next Friday and we will do an EKG         Sinusitis: Care Instructions  Your Care Instructions    Sinusitis is an infection of the lining of the sinus cavities in your head. Sinusitis often follows a cold. It causes pain and pressure in your head and face. In most cases, sinusitis gets better on its own in 1 to 2 weeks. But some mild symptoms may last for several weeks. Sometimes antibiotics are needed. Follow-up care is a key part of your treatment and safety. Be sure to make and go to all appointments, and call your doctor if you are having problems. It's also a good idea to know your test results and keep a list of the medicines you take. How can you care for yourself at home? · Take an over-the-counter pain medicine, such as acetaminophen (Tylenol), ibuprofen (Advil, Motrin), or naproxen (Aleve). Read and follow all instructions on the label. · If the doctor prescribed antibiotics, take them as directed. Do not stop taking them just because you feel better. You need to take the full course of antibiotics. · Be careful when taking over-the-counter cold or flu medicines and Tylenol at the same time. Many of these medicines have acetaminophen, which is Tylenol. Read the labels to make sure that you are not taking more than the recommended dose. Too much acetaminophen (Tylenol) can be harmful. · Breathe warm, moist air from a steamy shower, a hot bath, or a sink filled with hot water. Avoid cold, dry air. Using a humidifier in your home may help. Follow the directions for cleaning the machine. · Use saline (saltwater) nasal washes to help keep your nasal passages open and wash out mucus and bacteria. You can buy saline nose drops at a grocery store or drugstore.  Or you can make your own at home by adding 1 teaspoon of salt and 1 teaspoon of baking soda to 2 cups of distilled water. If you make your own, fill a bulb syringe with the solution, insert the tip into your nostril, and squeeze gently. [place] your nose. · Put a hot, wet towel or a warm gel pack on your face 3 or 4 times a day for 5 to 10 minutes each time. · Try a decongestant nasal spray like oxymetazoline (Afrin). Do not use it for more than 3 days in a row. Using it for more than 3 days can make your congestion worse. When should you call for help? Call your doctor now or seek immediate medical care if:  · You have new or worse swelling or redness in your face or around your eyes. · You have a new or higher fever. Watch closely for changes in your health, and be sure to contact your doctor if:  · You have new or worse facial pain. · The mucus from your nose becomes thicker (like pus) or has new blood in it. · You are not getting better as expected. Where can you learn more? Go to http://liya-valerio.info/. Enter B030 in the search box to learn more about \"Sinusitis: Care Instructions. \"  Current as of: July 29, 2016  Content Version: 11.2  © 8867-8084 [employer]. Care instructions adapted under license by [employer] (which disclaims liability or warranty for this information). If you have questions about a medical condition or this instruction, always ask your healthcare professional. [place] [address] any warranty or liability for your use of this information.
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
## 2017-04-26 PROBLEM — I45.81 LONG Q-T SYNDROME: Status: ACTIVE | Noted: 2017-04-26

## 2017-05-05 ENCOUNTER — OFFICE VISIT (OUTPATIENT)
Dept: FAMILY MEDICINE CLINIC | Age: 47
End: 2017-05-05

## 2017-05-05 ENCOUNTER — CLINICAL SUPPORT (OUTPATIENT)
Dept: CARDIOLOGY CLINIC | Age: 47
End: 2017-05-05

## 2017-05-05 VITALS
HEART RATE: 67 BPM | RESPIRATION RATE: 18 BRPM | HEIGHT: 63 IN | OXYGEN SATURATION: 96 % | WEIGHT: 159.8 LBS | DIASTOLIC BLOOD PRESSURE: 68 MMHG | SYSTOLIC BLOOD PRESSURE: 118 MMHG | BODY MASS INDEX: 28.31 KG/M2 | TEMPERATURE: 98.6 F

## 2017-05-05 DIAGNOSIS — Z87.410 HISTORY OF CERVICAL DYSPLASIA: ICD-10-CM

## 2017-05-05 DIAGNOSIS — Z80.0 FAMILY HISTORY OF COLON CANCER: ICD-10-CM

## 2017-05-05 DIAGNOSIS — N95.1 POST MENOPAUSAL SYNDROME: ICD-10-CM

## 2017-05-05 DIAGNOSIS — D12.6 ADENOMATOUS POLYP OF COLON, UNSPECIFIED PART OF COLON: ICD-10-CM

## 2017-05-05 DIAGNOSIS — F43.10 PTSD (POST-TRAUMATIC STRESS DISORDER): ICD-10-CM

## 2017-05-05 DIAGNOSIS — Z79.01 LONG TERM (CURRENT) USE OF ANTICOAGULANTS: ICD-10-CM

## 2017-05-05 DIAGNOSIS — Z12.4 CERVICAL CANCER SCREENING: ICD-10-CM

## 2017-05-05 DIAGNOSIS — Z95.2 S/P AVR (AORTIC VALVE REPLACEMENT): Primary | ICD-10-CM

## 2017-05-05 DIAGNOSIS — I45.81 LONG Q-T SYNDROME: ICD-10-CM

## 2017-05-05 DIAGNOSIS — Z00.00 ROUTINE GENERAL MEDICAL EXAMINATION AT A HEALTH CARE FACILITY: Primary | ICD-10-CM

## 2017-05-05 LAB
INR BLD: NORMAL
INR, EXTERNAL: 2.6 (ref 2.5–3.5)
PT POC: NORMAL SEC
VALID INTERNAL CONTROL?: YES

## 2017-05-05 RX ORDER — VENLAFAXINE HYDROCHLORIDE 37.5 MG/1
75 CAPSULE, EXTENDED RELEASE ORAL DAILY
COMMUNITY
Start: 2017-05-05 | End: 2017-05-16 | Stop reason: SDUPTHER

## 2017-05-05 RX ORDER — WARFARIN 1 MG/1
TABLET ORAL
Qty: 125 TAB | Refills: 3 | Status: SHIPPED | OUTPATIENT
Start: 2017-05-05 | End: 2017-11-14

## 2017-05-05 NOTE — PROGRESS NOTES
Chief Complaint   Patient presents with    Complete Physical     with pap       Reviewed Record in preparation for visit and have obtained necessary documentation. Identified pt with two pt identifiers (Name @ )    Health Maintenance Due   Topic    Pneumococcal 19-64 Medium Risk (1 of 1 - PPSV23)    DTaP/Tdap/Td series (1 - Tdap)         1. Have you been to the ER, urgent care clinic since your last visit? Hospitalized since your last visit? No    2. Have you seen or consulted any other health care providers outside of the [facility] since your last visit? Include any pap smears or colon screening.  No

## 2017-05-05 NOTE — PATIENT INSTRUCTIONS
Let's try to wean off the [place]  More water--lots of water! Walk 10 min daily, briskly, this is important for your depression/PTSD treatment    Try to get the shot record, jong for TDAP (tetanus/pertussis) and pneumonia           Well Visit, Ages 25 to 48: Care Instructions  Your Care Instructions  Physical exams can help you stay healthy. Your doctor has checked your overall health and may have suggested ways to take good care of yourself. He or she also may have recommended tests. At home, you can help prevent illness with healthy eating, regular exercise, and other steps. Follow-up care is a key part of your treatment and safety. Be sure to make and go to all appointments, and call your doctor if you are having problems. It's also a good idea to know your test results and keep a list of the medicines you take. How can you care for yourself at home? · Reach and stay at a healthy weight. This will lower your risk for many problems, such as obesity, diabetes, heart disease, and high blood pressure. · Get at least 30 minutes of physical activity on most days of the week. Walking is a good choice. You also may want to do other activities, such as running, swimming, cycling, or playing tennis or team sports. Discuss any changes in your exercise program with your doctor. · Do not smoke or allow others to smoke around you. If you need help quitting, talk to your doctor about stop-smoking programs and medicines. These can increase your chances of quitting for good. · Talk to your doctor about whether you have any risk factors for sexually transmitted infections (STIs). Having one sex partner (who does not have STIs and does not have sex with anyone else) is a good way to avoid these infections. · Use birth control if you do not want to have children at this time. Talk with your doctor about the choices available and what might be best for you. · Protect your skin from too much sun.  When you're outdoors from 10 a.m. to 4 p.m., stay in the shade or cover up with clothing and a hat with a wide brim. Wear sunglasses that block UV rays. Even when it's cloudy, put broad-spectrum sunscreen (SPF 30 or higher) on any exposed skin. · See a dentist one or two times a year for checkups and to have your teeth cleaned. · Wear a seat belt in the car. · Drink alcohol in moderation, if at all. That means no more than 2 drinks a day for men and 1 drink a day for women. Follow your doctor's advice about when to have certain tests. These tests can spot problems early. For everyone  · Cholesterol. Have the fat (cholesterol) in your blood tested after age 21. Your doctor will tell you how often to have this done based on your age, family history, or other things that can increase your risk for heart disease. · Blood pressure. Have your blood pressure checked during a routine doctor visit. Your doctor will tell you how often to check your blood pressure based on your age, your blood pressure results, and other factors. · Vision. Talk with your doctor about how often to have a glaucoma test.  · Diabetes. Ask your doctor whether you should have tests for diabetes. · Colon cancer. Have a test for colon cancer at age 48. You may have one of several tests. If you are younger than 48, you may need a test earlier if you have any risk factors. Risk factors include whether you already had a precancerous polyp removed from your colon or whether your parent, brother, sister, or child has had colon cancer. For women  · Breast exam and mammogram. Talk to your doctor about when you should have a clinical breast exam and a mammogram. Medical experts differ on whether and how often women under 50 should have these tests. Your doctor can help you decide what is right for you. · Pap test and pelvic exam. Begin Pap tests at age 24. A Pap test is the best way to find cervical cancer.  The test often is part of a pelvic exam. Ask how often to have this test.  · Tests for sexually transmitted infections (STIs). Ask whether you should have tests for STIs. You may be at risk if you have sex with more than one person, especially if your partners do not wear condoms. For men  · Tests for sexually transmitted infections (STIs). Ask whether you should have tests for STIs. You may be at risk if you have sex with more than one person, especially if you do not wear a condom. · Testicular cancer exam. Ask your doctor whether you should check your testicles regularly. · Prostate exam. Talk to your doctor about whether you should have a blood test (called a PSA test) for prostate cancer. Experts differ on whether and when men should have this test. Some experts suggest it if you are older than 39 and are -American or have a father or brother who got prostate cancer when he was younger than 72. When should you call for help? Watch closely for changes in your health, and be sure to contact your doctor if you have any problems or symptoms that concern you. Where can you learn more? Go to http://liya-valerio.info/. Enter P072 in the search box to learn more about \"Well Visit, Ages 25 to 48: Care Instructions. \"  Current as of: July 19, 2016  Content Version: 11.2  © 0902-2296 [employer]. Care instructions adapted under license by [employer] (which disclaims liability or warranty for this information). If you have questions about a medical condition or this instruction, always ask your healthcare professional. [place] [address] any warranty or liability for your use of this information.

## 2017-05-05 NOTE — MR AVS SNAPSHOT
Visit Information Date & Time Provider Department Dept. Phone Encounter #  
 5/5/2017  3:45 PM Syeda Rivera, [address] [phone] [id number] Your Appointments 6/1/2017  9:00 AM  
ESTABLISHED PATIENT with Sydnee Ledesma MD  
[facility] ([address]) Appt Note: 3 month follow up  
 [address]  
[address] [place] [id number]  
  
    
 6/1/2017  9:00 AM  
[facility] with EFREM JULIEN [facility] ([facility] SCHEDULING) Appt Note: 1 month. Dr Oma Slaughter also. [address] [place]  [address]  
[address] [phone] Upcoming Health Maintenance Date Due Pneumococcal 19-64 Medium Risk (1 of 1 - PPSV23) 1/7/1989 DTaP/Tdap/Td series (1 - Tdap) 1/7/1991 INFLUENZA AGE 9 TO ADULT 8/1/2017 COLONOSCOPY 2/10/2018 PAP AKA CERVICAL CYTOLOGY 4/25/2020 Allergies as of 5/5/2017  Review Complete On: 5/5/2017 By: Syeda Rivera MD  
  
 Severity Noted Reaction Type Reactions Contrast Dye [Iodine] High 03/02/2010   Systemic Anaphylaxis Lipitor [Atorvastatin]  03/21/2014    Myalgia And GI upset Percocet [Oxycodone-acetaminophen]  09/24/2015    Other (comments) Dizziness/fall Prozac [Fluoxetine]  02/21/2017    Other (comments) QT prolongation Sulfa (Sulfonamide Antibiotics)    Other (comments) Gi upset Tramadol  09/29/2016    Rash Vicoprofen [Hydrocodone-ibuprofen]  06/25/2014    Nausea and Vomiting Can take hydrocodone and ibuprofen separately, but not together Current Immunizations  Reviewed on 1/16/2017 Name Date Influenza Vaccine 11/13/2015 Influenza Vaccine Robert Beck) 11/16/2016 Influenza Vaccine (Quad) PF 1/16/2017  1:10 PM  
 Influenza Vaccine Whole 11/23/2007 Not reviewed this visit You Were Diagnosed With   
  
 Codes Comments Routine general medical examination at a health care facility    -  Primary ICD-10-CM: Z00.00 ICD-9-CM: V70.0 Long Q-T syndrome     ICD-10-CM: I45.81 ICD-9-CM: 426.82 History of cervical dysplasia     ICD-10-CM: Z87.410 ICD-9-CM: V13.22 Cervical cancer screening     ICD-10-CM: Z12.4 ICD-9-CM: V76.2 Adenomatous polyp of colon, unspecified part of colon     ICD-10-CM: D12.6 ICD-9-CM: 211.3 Family history of colon cancer     ICD-10-CM: Z80.0 ICD-9-CM: V16.0 Vitals BP Pulse Temp Resp Height(growth percentile) Weight(growth percentile)  
 118/68 (BP 1 Location: Left arm, BP Patient Position: Sitting) 67 98.6 °F (37 °C) (Oral) 18 5' 3\" (1.6 m) 159 lb 12.8 oz (72.5 kg) SpO2 BMI OB Status Smoking Status 96% 28.31 kg/m2 Hysterectomy Current Every Day Smoker Vitals History BMI and BSA Data Body Mass Index Body Surface Area  
 28.31 kg/m 2 1.8 m 2 Preferred Pharmacy Pharmacy Name Phone [facility] [address] [facility], [address] [place] [address] [place]. [phone] Your Updated Medication List  
  
   
This list is accurate as of: 5/5/17  4:38 PM.  Always use your most recent med list.  
  
  
  
  
 albuterol 90 mcg/actuation inhaler Commonly known as:  PROVENTIL HFA, VENTOLIN HFA, PROAIR HFA Take 2 Puffs by inhalation every four (4) hours as needed for Wheezing. aspirin 81 mg chewable tablet Take 1 Tab by mouth daily. * COUMADIN 1 mg tablet Generic drug:  warfarin Take 1 mg by mouth daily. Wednesday and Saturday * warfarin 1 mg tablet Commonly known as:  COUMADIN  
4 tablets daily except 5 tablets on Wednesday and Saturday. HYDROcodone-acetaminophen 5-325 mg per tablet Commonly known as:  Carlos Littler Take 1 Tab by mouth every four (4) hours as needed for Pain. Max Daily Amount: 6 Tabs. pravastatin 40 mg tablet Commonly known as:  PRAVACHOL Take 1 Tab by mouth nightly. therapeutic multivitamin tablet Commonly known as:  [facility] Take 1 Tab by mouth daily. venlafaxine-SR 37.5 mg capsule Commonly known as:  EFFEXOR-XR Take 2 Caps by mouth daily. ZyrTEC 10 mg tablet Generic drug:  cetirizine Take 10 mg by mouth as needed. * Notice: This list has 2 medication(s) that are the same as other medications prescribed for you. Read the directions carefully, and ask your doctor or other care provider to review them with you. We Performed the Following AMB POC EKG ROUTINE W/ 12 LEADS, INTER & REP [37307 CPT(R)] To-Do List   
 05/05/2017 Pathology:  PAP IG, APTIMA HPV AND RFX 16/18,45 ([id number]) Patient Instructions Let's try to wean off the [place] More water--lots of water! Walk 10 min daily, briskly, this is important for your depression/PTSD treatment Try to get the shot record, jong for TDAP (tetanus/pertussis) and pneumonia Well Visit, Ages 25 to 48: Care Instructions Your Care Instructions Physical exams can help you stay healthy. Your doctor has checked your overall health and may have suggested ways to take good care of yourself. He or she also may have recommended tests. At home, you can help prevent illness with healthy eating, regular exercise, and other steps. Follow-up care is a key part of your treatment and safety. Be sure to make and go to all appointments, and call your doctor if you are having problems. It's also a good idea to know your test results and keep a list of the medicines you take. How can you care for yourself at home? · Reach and stay at a healthy weight. This will lower your risk for many problems, such as obesity, diabetes, heart disease, and high blood pressure. · Get at least 30 minutes of physical activity on most days of the week. Walking is a good choice. You also may want to do other activities, such as running, swimming, cycling, or playing tennis or team sports. Discuss any changes in your exercise program with your doctor. · Do not smoke or allow others to smoke around you. If you need help quitting, talk to your doctor about stop-smoking programs and medicines. These can increase your chances of quitting for good. · Talk to your doctor about whether you have any risk factors for sexually transmitted infections (STIs). Having one sex partner (who does not have STIs and does not have sex with anyone else) is a good way to avoid these infections. · Use birth control if you do not want to have children at this time. Talk with your doctor about the choices available and what might be best for you. · Protect your skin from too much sun. When you're outdoors from 10 a.m. to 4 p.m., stay in the shade or cover up with clothing and a hat with a wide brim. Wear sunglasses that block UV rays. Even when it's cloudy, put broad-spectrum sunscreen (SPF 30 or higher) on any exposed skin. · See a dentist one or two times a year for checkups and to have your teeth cleaned. · Wear a seat belt in the car. · Drink alcohol in moderation, if at all. That means no more than 2 drinks a day for men and 1 drink a day for women. Follow your doctor's advice about when to have certain tests. These tests can spot problems early. For everyone · Cholesterol. Have the fat (cholesterol) in your blood tested after age 21. Your doctor will tell you how often to have this done based on your age, family history, or other things that can increase your risk for heart disease. · Blood pressure. Have your blood pressure checked during a routine doctor visit. Your doctor will tell you how often to check your blood pressure based on your age, your blood pressure results, and other factors. · Vision.  Talk with your doctor about how often to have a glaucoma test. 
 · Diabetes. Ask your doctor whether you should have tests for diabetes. · Colon cancer. Have a test for colon cancer at age 48. You may have one of several tests. If you are younger than 48, you may need a test earlier if you have any risk factors. Risk factors include whether you already had a precancerous polyp removed from your colon or whether your parent, brother, sister, or child has had colon cancer. For women · Breast exam and mammogram. Talk to your doctor about when you should have a clinical breast exam and a mammogram. Medical experts differ on whether and how often women under 50 should have these tests. Your doctor can help you decide what is right for you. · Pap test and pelvic exam. Begin Pap tests at age 24. A Pap test is the best way to find cervical cancer. The test often is part of a pelvic exam. Ask how often to have this test. 
· Tests for sexually transmitted infections (STIs). Ask whether you should have tests for STIs. You may be at risk if you have sex with more than one person, especially if your partners do not wear condoms. For men · Tests for sexually transmitted infections (STIs). Ask whether you should have tests for STIs. You may be at risk if you have sex with more than one person, especially if you do not wear a condom. · Testicular cancer exam. Ask your doctor whether you should check your testicles regularly. · Prostate exam. Talk to your doctor about whether you should have a blood test (called a PSA test) for prostate cancer. Experts differ on whether and when men should have this test. Some experts suggest it if you are older than 39 and are -American or have a father or brother who got prostate cancer when he was younger than 72. When should you call for help? Watch closely for changes in your health, and be sure to contact your doctor if you have any problems or symptoms that concern you. Where can you learn more? Go to http://liya-valerio.info/. Enter P072 in the search box to learn more about \"Well Visit, Ages 25 to 48: Care Instructions. \" Current as of: July 19, 2016 Content Version: 11.2 © 0149-8463 [employer]. Care instructions adapted under license by [employer] (which disclaims liability or warranty for this information). If you have questions about a medical condition or this instruction, always ask your healthcare professional. [address] any warranty or liability for your use of this information. Introducing [facility] & HEALTH SERVICES! Dear Cristy Mohs: 
Thank you for requesting a [employer] account. Our records indicate that you have previously registered for a [employer] account but its currently inactive. Please call our [employer] support line at [phone]. Additional Information If you have questions, please visit the Frequently Asked Questions section of the [employer] website at [email or web address] Remember, [employer] is NOT to be used for urgent needs. For medical emergencies, dial 911. Now available from your iPhone and Android! Please provide this summary of care documentation to your next provider. Your primary care clinician is listed as Fiona Smith. If you have any questions after today's visit, please call [phone].
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
## 2017-05-16 RX ORDER — VENLAFAXINE HYDROCHLORIDE 37.5 MG/1
75 CAPSULE, EXTENDED RELEASE ORAL DAILY
Qty: 60 CAP | Refills: 0 | Status: SHIPPED | OUTPATIENT
Start: 2017-05-16 | End: 2017-05-23

## 2017-05-16 NOTE — TELEPHONE ENCOUNTER
[facility]  [phone]    Patient states that she does not have enough Effexor to get her through until her appointment on 05/23/17. She is requesting a gap prescription.

## 2017-05-19 PROBLEM — R87.622 VAGINAL PAP SMEAR WITH LGSIL: Status: ACTIVE | Noted: 2017-05-19

## 2017-05-23 ENCOUNTER — OFFICE VISIT (OUTPATIENT)
Dept: FAMILY MEDICINE CLINIC | Age: 47
End: 2017-05-23

## 2017-05-23 VITALS
BODY MASS INDEX: 28.03 KG/M2 | HEIGHT: 63 IN | TEMPERATURE: 98 F | RESPIRATION RATE: 16 BRPM | DIASTOLIC BLOOD PRESSURE: 60 MMHG | SYSTOLIC BLOOD PRESSURE: 104 MMHG | WEIGHT: 158.2 LBS | OXYGEN SATURATION: 98 % | HEART RATE: 67 BPM

## 2017-05-23 DIAGNOSIS — M71.332 SYNOVIAL CYST OF WRIST, LEFT: ICD-10-CM

## 2017-05-23 DIAGNOSIS — F43.10 PTSD (POST-TRAUMATIC STRESS DISORDER): ICD-10-CM

## 2017-05-23 DIAGNOSIS — Z79.899 MEDICATION MANAGEMENT: ICD-10-CM

## 2017-05-23 DIAGNOSIS — I45.81 LONG Q-T SYNDROME: Primary | ICD-10-CM

## 2017-05-23 RX ORDER — VENLAFAXINE HYDROCHLORIDE 75 MG/1
75 CAPSULE, EXTENDED RELEASE ORAL DAILY
Qty: 90 CAP | Refills: 3 | Status: SHIPPED | OUTPATIENT
Start: 2017-05-23

## 2017-05-23 RX ORDER — PRAVASTATIN SODIUM 40 MG/1
40 TABLET ORAL
Qty: 90 TAB | Refills: 3 | COMMUNITY
Start: 2017-05-23 | End: 2017-12-08 | Stop reason: SDUPTHER
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
## 2017-05-23 NOTE — PROGRESS NOTES
Chief Complaint   Patient presents with    Depression       Reviewed Record in preparation for visit and have obtained necessary documentation. Identified pt with two pt identifiers (Name @ )    Health Maintenance Due   Topic    Pneumococcal 19-64 Medium Risk (1 of 1 - PPSV23)    DTaP/Tdap/Td series (1 - Tdap)         1. Have you been to the ER, urgent care clinic since your last visit? Hospitalized since your last visit? No    2. Have you seen or consulted any other health care providers outside of the [facility] since your last visit? Include any pap smears or colon screening.  No

## 2017-05-23 NOTE — MR AVS SNAPSHOT
Visit Information Date & Time Provider Department Dept. Phone Encounter #  
 5/23/2017  8:15 AM Natalia Lao, [address] [facility] [phone] [id number] Follow-up Instructions Return in about 3 months (around 8/23/2017). Your Appointments 6/1/2017  9:00 AM  
ESTABLISHED PATIENT with Kamini Driver MD  
[facility] ([facility]) Appt Note: 3 month follow up  
 [phone]  
[address] [phone]  
  
    
 6/1/2017  9:00 AM  
COUMADIN CLINIC with EFREM [facility]UL[facility] ([facility] SCHEDULING) Appt Note: 1 month. Dr Anders Quintanilla also. [address]  [address]  
[address] [address] Upcoming Health Maintenance Date Due Pneumococcal 19-64 Medium Risk (1 of 1 - PPSV23) 1/7/1989 DTaP/Tdap/Td series (1 - Tdap) 1/7/1991 INFLUENZA AGE 9 TO ADULT 8/1/2017 PAP AKA CERVICAL CYTOLOGY 5/5/2018 Allergies as of 5/23/2017  Review Complete On: 5/23/2017 By: Natalia Lao MD  
  
 Severity Noted Reaction Type Reactions Contrast Dye [Iodine] High 03/02/2010   Systemic Anaphylaxis Lipitor [Atorvastatin]  03/21/2014    Myalgia And GI upset Percocet [Oxycodone-acetaminophen]  09/24/2015    Other (comments) Dizziness/fall Prozac [Fluoxetine]  02/21/2017    Other (comments) QT prolongation Sulfa (Sulfonamide Antibiotics)    Other (comments) Gi upset Tramadol  09/29/2016    Rash Vicoprofen [Hydrocodone-ibuprofen]  06/25/2014    Nausea and Vomiting Can take hydrocodone and ibuprofen separately, but not together Current Immunizations  Reviewed on 1/16/2017 Name Date Influenza Vaccine 11/13/2015 Influenza Vaccine [place]) 11/16/2016  Influenza Vaccine (Quad) PF 1/16/2017  1:10 PM  
 Influenza Vaccine Whole 11/23/2007 Not reviewed this visit You Were Diagnosed With   
  
 Codes Comments Long Q-T syndrome    -  Primary ICD-10-CM: I45.81 ICD-9-CM: 426.82 Synovial cyst of wrist, left     ICD-10-CM: Q13.862 ICD-9-CM: 727.41 PTSD (post-traumatic stress disorder)     ICD-10-CM: F43.10 ICD-9-CM: 309.81 Medication management     ICD-10-CM: Z79.899 ICD-9-CM: V58.69 Vitals BP Pulse Temp Resp Height(growth percentile) Weight(growth percentile) 104/60 (BP 1 Location: Left arm, BP Patient Position: Sitting) 67 98 °F (36.7 °C) (Oral) 16 5' 3\" (1.6 m) 158 lb 3.2 oz (71.8 kg) SpO2 BMI OB Status Smoking Status 98% 28.02 kg/m2 Hysterectomy Current Every Day Smoker Vitals History BMI and BSA Data Body Mass Index Body Surface Area 28.02 kg/m 2 1.79 m 2 Preferred Pharmacy Pharmacy Name Phone Zayra Carrera [address] [place] [address], [address] [place] [address] [facility]. [phone] Your Updated Medication List  
  
   
This list is accurate as of: 5/23/17  8:39 AM.  Always use your most recent med list.  
  
  
  
  
 aspirin 81 mg chewable tablet Take 1 Tab by mouth daily. * COUMADIN 1 mg tablet Generic drug:  warfarin Take 1 mg by mouth daily. Wednesday and Saturday * warfarin 1 mg tablet Commonly known as:  COUMADIN  
4 tablets daily except 5 tablets on Wednesday and Saturday. pravastatin 40 mg tablet Commonly known as:  PRAVACHOL Take 1 Tab by mouth nightly. therapeutic multivitamin tablet Commonly known as:  [facility] Take 1 Tab by mouth daily. venlafaxine-SR 75 mg capsule Commonly known as:  EFFEXOR-XR Take 1 Cap by mouth daily. ZyrTEC 10 mg tablet Generic drug:  cetirizine Take 10 mg by mouth as needed. * Notice: This list has 2 medication(s) that are the same as other medications prescribed for you.  Read the directions carefully, and ask your doctor or other care provider to review them with you. Prescriptions Sent to Pharmacy Refills  
 venlafaxine-SR (EFFEXOR-XR) 75 mg capsule 3 Sig: Take 1 Cap by mouth daily. Class: Normal  
 Pharmacy: [facility], [facility] [address] [place].  #: [phone] Route: Oral  
  
We Performed the Following AMB POC EKG ROUTINE W/ 12 LEADS, INTER & REP [33212 CPT(R)] AMB POC PT/INR [35595 CPT(R)] Follow-up Instructions Return in about 3 months (around 8/23/2017). Patient Instructions Post-Traumatic Stress Disorder (PTSD): Care Instructions Your Care Instructions Post-traumatic stress disorder (PTSD) is a mental condition that can result from being in or seeing a traumatic or terrifying event. These events can include combat, a terrorist attack, a natural disaster, a serious accident, an assault, or a rape. If you have PTSD, you may often relive the experience in nightmares or flashbacks. These are clear and frightening memories of the event. You may also have trouble sleeping. PTSD affects people in very different ways. It can interfere with daily activities such as work or school, and it can make you withdraw from friends or loved ones. Follow-up care is a key part of your treatment and safety. Be sure to make and go to all appointments, and call your doctor if you are having problems. It's also a good idea to know your test results and keep a list of the medicines you take. How can you care for yourself at home? · Take medicines exactly as directed. Call your doctor if you think you are having a problem with your medicine. · Go to your counseling sessions and follow-up appointments. · Recognize and accept your anxiety. Then, when you are in a situation that makes you anxious, say to yourself, \"This is not an emergency. I feel uncomfortable, but I am not in danger.  I can keep going even if I feel anxious. \" · Be kind to your body: ¨ Relieve tension with exercise or a massage. ¨ Get enough rest. 
¨ Avoid alcohol, caffeine, nicotine, and illegal drugs. They can increase your anxiety level and cause sleep problems. ¨ Learn and do relaxation techniques. See below for more about these techniques. · Engage your mind. Get out and do something you enjoy. Go to a funny movie, or take a walk or hike. Plan your day. Having too much or too little to do can make you anxious. · Keep a record of your symptoms. Discuss your fears with a good friend or family member, or join a support group for people with similar problems. Talking to others sometimes relieves stress. · Get involved in social groups, or volunteer to help others. Being alone sometimes makes things seem worse than they are. · Get at least 30 minutes of exercise on most days of the week. Walking is a good choice. You also may want to do other activities, such as running, swimming, cycling, or playing tennis or team sports. · Keep the numbers for these national suicide hotlines: 9-264-999-TALK (4-697.215.7594) and 8-676-CFDJSGM (6-514.592.8988). If you or someone you know talks about suicide or feeling hopeless, get help right away. Relaxation techniques Do relaxation exercises 10 to 20 minutes a day. You can play soothing, relaxing music while you do them, if you wish. · Tell others in your house that you are going to do your relaxation exercises. Ask them not to disturb you. · Find a comfortable place, away from all distractions and noise. · Lie down on your back, or sit with your back straight. · Focus on your breathing. Make it slow and steady. · Breathe in through your nose. Breathe out through either your nose or mouth. · Breathe deeply, filling up the area between your navel and your rib cage. Breathe so that your belly goes up and down. · Do not hold your breath. · Breathe like this for 5 to 10 minutes.  Notice the feeling of calmness throughout your whole body. As you continue to breathe slowly and deeply, relax by doing the following for another 5 to 10 minutes: · Tighten and relax each muscle group in your body. You can begin at your toes and work your way up to your head. · Imagine your muscle groups relaxing and becoming heavy. · Empty your mind of all thoughts. · Let yourself relax more and more deeply. · Become aware of the state of calmness that surrounds you. · When your relaxation time is over, you can bring yourself back to alertness by moving your fingers and toes and then your hands and feet and then stretching and moving your entire body. Sometimes people fall asleep during relaxation, but they usually wake up shortly afterward. · Always give yourself time to return to full alertness before you drive a car or do anything that might cause an accident if you are not fully alert. Never play a relaxation tape while you drive a car. When should you call for help? Call 911 anytime you think you may need emergency care. For example, call if: 
· You feel you cannot stop from hurting yourself or someone else. Watch closely for changes in your health, and be sure to contact your doctor if: 
· Your PTSD symptoms are getting worse. · You have new or worsening symptoms of anxiety. · You are not getting better as expected. Where can you learn more? Go to http://liya-valerio.info/. Nidia Pradhan in the search box to learn more about \"Post-Traumatic Stress Disorder (PTSD): Care Instructions. \" Current as of: July 26, 2016 Content Version: 11.2 © 0104-5205 Healthwise[employer]. Care instructions adapted under license by [employer] (which disclaims liability or warranty for this information). If you have questions about a medical condition or this instruction, always ask your healthcare professional. [address] any warranty or liability for your use of this information. Introducing [facility]! Dear Darius Gabriel: 
Thank you for requesting a [employer] account. Our records indicate that you have previously registered for a [employer] account but its currently inactive. Please call our [employer] support line at [phone]. Additional Information If you have questions, please visit the Frequently Asked Questions section of the [employer] website at [email or web address] Remember, [employer] is NOT to be used for urgent needs. For medical emergencies, dial 911. Now available from your iPhone and Android! Please provide this summary of care documentation to your next provider. Your primary care clinician is listed as Kamini Lopez. If you have any questions after today's visit, please call [phone].

## 2017-05-23 NOTE — PATIENT INSTRUCTIONS
Post-Traumatic Stress Disorder (PTSD): Care Instructions  Your Care Instructions  Post-traumatic stress disorder (PTSD) is a mental condition that can result from being in or seeing a traumatic or terrifying event. These events can include combat, a terrorist attack, a natural disaster, a serious accident, an assault, or a rape. If you have PTSD, you may often relive the experience in nightmares or flashbacks. These are clear and frightening memories of the event. You may also have trouble sleeping. PTSD affects people in very different ways. It can interfere with daily activities such as work or school, and it can make you withdraw from friends or loved ones. Follow-up care is a key part of your treatment and safety. Be sure to make and go to all appointments, and call your doctor if you are having problems. It's also a good idea to know your test results and keep a list of the medicines you take. How can you care for yourself at home? · Take medicines exactly as directed. Call your doctor if you think you are having a problem with your medicine. · Go to your counseling sessions and follow-up appointments. · Recognize and accept your anxiety. Then, when you are in a situation that makes you anxious, say to yourself, \"This is not an emergency. I feel uncomfortable, but I am not in danger. I can keep going even if I feel anxious. \"  · Be kind to your body:  ¨ Relieve tension with exercise or a massage. ¨ Get enough rest.  ¨ Avoid alcohol, caffeine, nicotine, and illegal drugs. They can increase your anxiety level and cause sleep problems. ¨ Learn and do relaxation techniques. See below for more about these techniques. · Engage your mind. Get out and do something you enjoy. Go to a funny movie, or take a walk or hike. Plan your day. Having too much or too little to do can make you anxious. · Keep a record of your symptoms.  Discuss your fears with a good friend or family member, or join a support group for people with similar problems. Talking to others sometimes relieves stress. · Get involved in social groups, or volunteer to help others. Being alone sometimes makes things seem worse than they are. · Get at least 30 minutes of exercise on most days of the week. Walking is a good choice. You also may want to do other activities, such as running, swimming, cycling, or playing tennis or team sports. · Keep the numbers for these national suicide hotlines: 6-092-783-TALK (1-923.386.6557) and 0-816-TFBVTLQ (2-933.740.2131). If you or someone you know talks about suicide or feeling hopeless, get help right away. Relaxation techniques  Do relaxation exercises 10 to 20 minutes a day. You can play soothing, relaxing music while you do them, if you wish. · Tell others in your house that you are going to do your relaxation exercises. Ask them not to disturb you. · Find a comfortable place, away from all distractions and noise. · Lie down on your back, or sit with your back straight. · Focus on your breathing. Make it slow and steady. · Breathe in through your nose. Breathe out through either your nose or mouth. · Breathe deeply, filling up the area between your navel and your rib cage. Breathe so that your belly goes up and down. · Do not hold your breath. · Breathe like this for 5 to 10 minutes. Notice the feeling of calmness throughout your whole body. As you continue to breathe slowly and deeply, relax by doing the following for another 5 to 10 minutes:  · Tighten and relax each muscle group in your body. You can begin at your toes and work your way up to your head. · Imagine your muscle groups relaxing and becoming heavy. · Empty your mind of all thoughts. · Let yourself relax more and more deeply. · Become aware of the state of calmness that surrounds you.   · When your relaxation time is over, you can bring yourself back to alertness by moving your fingers and toes and then your hands and feet and then stretching and moving your entire body. Sometimes people fall asleep during relaxation, but they usually wake up shortly afterward. · Always give yourself time to return to full alertness before you drive a car or do anything that might cause an accident if you are not fully alert. Never play a relaxation tape while you drive a car. When should you call for help? Call 911 anytime you think you may need emergency care. For example, call if:  · You feel you cannot stop from hurting yourself or someone else. Watch closely for changes in your health, and be sure to contact your doctor if:  · Your PTSD symptoms are getting worse. · You have new or worsening symptoms of anxiety. · You are not getting better as expected. Where can you learn more? Go to http://liya-valerio.info/. Sofía Worrell in the search box to learn more about \"Post-Traumatic Stress Disorder (PTSD): Care Instructions. \"  Current as of: July 26, 2016  Content Version: 11.2  © 8720-2511 [employer]. Care instructions adapted under license by [employer] (which disclaims liability or warranty for this information). If you have questions about a medical condition or this instruction, always ask your healthcare professional. [place] [address] any warranty or liability for your use of this information.

## 2017-05-24 LAB
INR PPP: 2.9 (ref 0.8–1.2)
PROTHROMBIN TIME: 29 SEC (ref 9.1–12)

## 2017-06-01 ENCOUNTER — OFFICE VISIT (OUTPATIENT)
Dept: CARDIOLOGY CLINIC | Age: 47
End: 2017-06-01

## 2017-06-01 ENCOUNTER — CLINICAL SUPPORT (OUTPATIENT)
Dept: CARDIOLOGY CLINIC | Age: 47
End: 2017-06-01

## 2017-06-01 VITALS
DIASTOLIC BLOOD PRESSURE: 80 MMHG | BODY MASS INDEX: 28 KG/M2 | OXYGEN SATURATION: 98 % | HEART RATE: 74 BPM | WEIGHT: 158 LBS | RESPIRATION RATE: 16 BRPM | HEIGHT: 63 IN | SYSTOLIC BLOOD PRESSURE: 122 MMHG

## 2017-06-01 DIAGNOSIS — I77.9 CAROTID ARTERY DISEASE WITHOUT CEREBRAL INFARCTION (HCC): ICD-10-CM

## 2017-06-01 DIAGNOSIS — I35.0 NONRHEUMATIC AORTIC VALVE STENOSIS: Primary | ICD-10-CM

## 2017-06-01 DIAGNOSIS — Z79.01 LONG TERM (CURRENT) USE OF ANTICOAGULANTS: ICD-10-CM

## 2017-06-01 DIAGNOSIS — Z95.2 S/P AVR (AORTIC VALVE REPLACEMENT): Primary | ICD-10-CM

## 2017-06-01 LAB
INR BLD: NORMAL
INR, EXTERNAL: 2.2 (ref 2.5–3.5)
PT POC: NORMAL SECONDS
VALID INTERNAL CONTROL?: YES
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
## 2017-06-01 NOTE — MR AVS SNAPSHOT
Visit Information Date & Time Provider Department Dept. Phone Encounter #  
 6/1/2017  9:00 AM Chip Tohrnton MD [facility] [place] [phone] [id number] Follow-up Instructions Return in about 6 months (around 12/1/2017). Your Appointments 6/8/2017  9:00 AM  
COUMADIN CLINIC with EFREM JULIEN [facility] ([facility] SCHEDULING) Appt Note: 1 week [address] [place] [address]  
[address] [address] [place] [address]  
  
    
 8/22/2017  8:15 AM  
ROUTINE CARE with Ghanshyam Ariza MD  
[facility]) Appt Note: 3 months f/u appt  
 [address] [id number]  
[phone]  
  
   
 [address] [phone] Upcoming Health Maintenance Date Due Pneumococcal 19-64 Medium Risk (1 of 1 - PPSV23) 1/7/1989 DTaP/Tdap/Td series (1 - Tdap) 1/7/1991 INFLUENZA AGE 9 TO ADULT 8/1/2017 PAP AKA CERVICAL CYTOLOGY 5/5/2018 Allergies as of 6/1/2017  Review Complete On: 6/1/2017 By: Marylin Aggarwal LPN Severity Noted Reaction Type Reactions Contrast Dye [Iodine] High 03/02/2010   Systemic Anaphylaxis Lipitor [Atorvastatin]  03/21/2014    Myalgia And GI upset Percocet [Oxycodone-acetaminophen]  09/24/2015    Other (comments) Dizziness/fall Prozac [Fluoxetine]  02/21/2017    Other (comments) QT prolongation Sulfa (Sulfonamide Antibiotics)    Other (comments) Gi upset Tramadol  09/29/2016    Rash Vicoprofen [Hydrocodone-ibuprofen]  06/25/2014    Nausea and Vomiting Can take hydrocodone and ibuprofen separately, but not together Current Immunizations  Reviewed on 1/16/2017 Name Date Influenza Vaccine 11/13/2015 Influenza Vaccine Roylene Moan) 11/16/2016 Influenza Vaccine (Quad) PF 1/16/2017  1:10 PM  
 Influenza Vaccine Whole 11/23/2007 Not reviewed this visit You Were Diagnosed With   
  
 Codes Comments Nonrheumatic aortic valve stenosis    -  Primary ICD-10-CM: I35.0 ICD-9-CM: 424.1 Carotid artery disease without cerebral infarction [facility])     ICD-10-CM: I77.9 ICD-9-CM: 278. 9 Vitals BP Pulse Resp Height(growth percentile) Weight(growth percentile) SpO2  
 122/80 (BP 1 Location: Left arm, BP Patient Position: Sitting) 74 16 5' 3\" (1.6 m) 158 lb (71.7 kg) 98% BMI OB Status Smoking Status 27.99 kg/m2 Hysterectomy Current Every Day Smoker BMI and BSA Data Body Mass Index Body Surface Area  
 27.99 kg/m 2 1.79 m 2 Preferred Pharmacy Pharmacy Name Phone [phone] [address] [phone] Your Updated Medication List  
  
   
This list is accurate as of: 6/1/17  9:33 AM.  Always use your most recent med list.  
  
  
  
  
 aspirin 81 mg chewable tablet Take 1 Tab by mouth daily. * COUMADIN 1 mg tablet Generic drug:  warfarin Take 1 mg by mouth daily. Wednesday and Saturday * warfarin 1 mg tablet Commonly known as:  COUMADIN  
4 tablets daily except 5 tablets on Wednesday and Saturday. pravastatin 40 mg tablet Commonly known as:  PRAVACHOL Take 1 Tab by mouth nightly. therapeutic multivitamin tablet Commonly known as:  [facility] Take 1 Tab by mouth daily. venlafaxine-SR 75 mg capsule Commonly known as:  EFFEXOR-XR Take 1 Cap by mouth daily. ZyrTEC 10 mg tablet Generic drug:  cetirizine Take 10 mg by mouth as needed. * Notice: This list has 2 medication(s) that are the same as other medications prescribed for you. Read the directions carefully, and ask your doctor or other care provider to review them with you. Follow-up Instructions Return in about 6 months (around 12/1/2017). Introducing [facility]! Dear Joseluis Looney: Thank you for requesting a [employer] account. Our records indicate that you have previously registered for a [employer] account but its currently inactive. Please call our [employer] support line at [phone]. Additional Information If you have questions, please visit the Frequently Asked Questions section of the [employer] website at [email or web address] Remember, [employer] is NOT to be used for urgent needs. For medical emergencies, dial 911. Now available from your iPhone and Android! Please provide this summary of care documentation to your next provider. Your primary care clinician is listed as Anastasiia Rahman. If you have any questions after today's visit, please call [phone].

## 2017-06-08 ENCOUNTER — CLINICAL SUPPORT (OUTPATIENT)
Dept: CARDIOLOGY CLINIC | Age: 47
End: 2017-06-08

## 2017-06-08 DIAGNOSIS — Z79.01 LONG TERM (CURRENT) USE OF ANTICOAGULANTS: ICD-10-CM

## 2017-06-08 DIAGNOSIS — Z95.2 S/P AVR (AORTIC VALVE REPLACEMENT): Primary | ICD-10-CM

## 2017-06-08 LAB
INR BLD: NORMAL
INR, EXTERNAL: 2.6 (ref 2.5–3.5)
PT POC: NORMAL SECONDS
VALID INTERNAL CONTROL?: YES

## 2017-07-21 ENCOUNTER — CLINICAL SUPPORT (OUTPATIENT)
Dept: CARDIOLOGY CLINIC | Age: 47
End: 2017-07-21

## 2017-07-21 DIAGNOSIS — Z79.01 LONG TERM (CURRENT) USE OF ANTICOAGULANTS: ICD-10-CM

## 2017-07-21 DIAGNOSIS — Z95.2 S/P AVR (AORTIC VALVE REPLACEMENT): Primary | ICD-10-CM

## 2017-07-21 LAB — INR, EXTERNAL: 2.9 (ref 2.5–3.5)

## 2017-07-24 LAB
INR BLD: NORMAL
PT POC: NORMAL SECONDS
VALID INTERNAL CONTROL?: YES

## 2017-08-21 ENCOUNTER — CLINICAL SUPPORT (OUTPATIENT)
Dept: CARDIOLOGY CLINIC | Age: 47
End: 2017-08-21

## 2017-08-21 DIAGNOSIS — Z95.2 S/P AVR (AORTIC VALVE REPLACEMENT): ICD-10-CM

## 2017-08-21 DIAGNOSIS — Z79.01 LONG TERM (CURRENT) USE OF ANTICOAGULANTS: Primary | ICD-10-CM

## 2017-08-21 LAB
INR BLD: NORMAL
INR, EXTERNAL: 2.8 (ref 2.5–3.5)
PT POC: NORMAL SECONDS
VALID INTERNAL CONTROL?: YES

## 2017-08-22 ENCOUNTER — OFFICE VISIT (OUTPATIENT)
Dept: FAMILY MEDICINE CLINIC | Age: 47
End: 2017-08-22

## 2017-08-22 VITALS
WEIGHT: 157.4 LBS | OXYGEN SATURATION: 98 % | HEIGHT: 63 IN | RESPIRATION RATE: 16 BRPM | DIASTOLIC BLOOD PRESSURE: 64 MMHG | SYSTOLIC BLOOD PRESSURE: 128 MMHG | BODY MASS INDEX: 27.89 KG/M2 | TEMPERATURE: 98.1 F | HEART RATE: 71 BPM

## 2017-08-22 DIAGNOSIS — L73.1 INGROWING HAIR: ICD-10-CM

## 2017-08-22 DIAGNOSIS — Z00.00 PREVENTATIVE HEALTH CARE: Primary | ICD-10-CM

## 2017-08-22 DIAGNOSIS — I45.81 LONG Q-T SYNDROME: ICD-10-CM

## 2017-08-22 DIAGNOSIS — E55.9 VITAMIN D DEFICIENCY: ICD-10-CM

## 2017-08-22 DIAGNOSIS — Z95.2 S/P AVR (AORTIC VALVE REPLACEMENT): ICD-10-CM

## 2017-08-22 DIAGNOSIS — E78.00 HYPERCHOLESTEREMIA: Chronic | ICD-10-CM

## 2017-08-22 DIAGNOSIS — F43.10 PTSD (POST-TRAUMATIC STRESS DISORDER): ICD-10-CM
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
## 2017-08-22 NOTE — PATIENT INSTRUCTIONS

## 2017-08-22 NOTE — MR AVS SNAPSHOT
Visit Information Date & Time Provider Department Dept. Phone Encounter #  
 8/22/2017  8:15 AM Izaiah Fajardo  [facility] [phone] [id number] Follow-up Instructions Return in about 6 months (around 2/22/2018). Your Appointments 9/18/2017  9:20 AM  
COUMADIN CLINIC with EFREM [facility]UL[facility] SCHEDULING) Appt Note: 1 month INR  
 [address] [facility] [address]  
[address] [facility]  
  
    
 12/4/2017  8:40 AM  
ESTABLISHED PATIENT with Ric Lopez MD  
[facility] Appt Note: 6 mo f/u  
 [address]  [address]  
[address] [address] Upcoming Health Maintenance Date Due DTaP/Tdap/Td series (1 - Tdap) 1/7/1991 INFLUENZA AGE 9 TO ADULT 8/1/2017 PAP AKA CERVICAL CYTOLOGY 5/10/2018 Allergies as of 8/22/2017  Review Complete On: 8/22/2017 By: Izaiah Fajardo MD  
  
 Severity Noted Reaction Type Reactions Contrast Dye [Iodine] High 03/02/2010   Systemic Anaphylaxis Chantix [Varenicline]  08/22/2017   Intolerance Other (comments) Psych side effects Lipitor [Atorvastatin]  03/21/2014    Myalgia And GI upset Percocet [Oxycodone-acetaminophen]  09/24/2015    Other (comments) Dizziness/fall Prozac [Fluoxetine]  02/21/2017    Other (comments) QT prolongation Sulfa (Sulfonamide Antibiotics)    Other (comments) Gi upset Tramadol  09/29/2016    Rash Vicoprofen [Hydrocodone-ibuprofen]  06/25/2014    Nausea and Vomiting Can take hydrocodone and ibuprofen separately, but not together Wellbutrin [Bupropion]  08/22/2017   Intolerance Other (comments)  
 tonny Current Immunizations  Reviewed on 8/9/2017 Name Date Influenza Vaccine 11/13/2015 Influenza Vaccine Jaja Rivera) 11/16/2016 Influenza Vaccine (Quad) PF 1/16/2017  1:10 PM  
 Influenza Vaccine Whole 11/23/2007 Pneumococcal Vaccine (Unspecified Type) 11/4/2009 Tdap 8/1/2013 Not reviewed this visit You Were Diagnosed With   
  
 Codes Comments Preventative health care    -  Primary ICD-10-CM: Z00.00 ICD-9-CM: V70.0 Hypercholesteremia     ICD-10-CM: E78.00 ICD-9-CM: 272.0 PTSD (post-traumatic stress disorder)     ICD-10-CM: F43.10 ICD-9-CM: 309.81 Long Q-T syndrome     ICD-10-CM: I45.81 ICD-9-CM: 426.82 Vitamin D deficiency     ICD-10-CM: E55.9 ICD-9-CM: 268.9 Ingrowing hair     ICD-10-CM: L73.1 ICD-9-CM: 704.8 Vitals BP Pulse Temp Resp Height(growth percentile) Weight(growth percentile) 128/64 (BP 1 Location: Right arm, BP Patient Position: Sitting) 71 98.1 °F (36.7 °C) (Oral) 16 5' 3\" (1.6 m) 157 lb 6.4 oz (71.4 kg) SpO2 BMI OB Status Smoking Status 98% 27.88 kg/m2 Hysterectomy Current Every Day Smoker Vitals History BMI and BSA Data Body Mass Index Body Surface Area  
 27.88 kg/m 2 1.78 m 2 Preferred Pharmacy Pharmacy Name Phone [facility] [address] [place] [address] [facility]. [phone] Your Updated Medication List  
  
   
This list is accurate as of: 8/22/17  8:51 AM.  Always use your most recent med list.  
  
  
  
  
 aspirin 81 mg chewable tablet Take 1 Tab by mouth daily. pravastatin 40 mg tablet Commonly known as:  PRAVACHOL Take 1 Tab by mouth nightly. therapeutic multivitamin tablet Commonly known as:  [facility] Take 1 Tab by mouth daily. venlafaxine-SR 75 mg capsule Commonly known as:  EFFEXOR-XR Take 1 Cap by mouth daily. warfarin 1 mg tablet Commonly known as:  COUMADIN  
4 tablets daily except 5 tablets on Wednesday and Saturday. ZyrTEC 10 mg tablet Generic drug:  cetirizine Take 10 mg by mouth as needed. We Performed the Following LIPID PANEL [96818 CPT(R)] METABOLIC PANEL, COMPREHENSIVE [71187 CPT(R)] VITAMIN D, 25 HYDROXY [id number] CPT(R)] Follow-up Instructions Return in about 6 months (around 2/22/2018). Patient Instructions Well Visit, Ages 25 to 48: Care Instructions Your Care Instructions Physical exams can help you stay healthy. Your doctor has checked your overall health and may have suggested ways to take good care of yourself. He or she also may have recommended tests. At home, you can help prevent illness with healthy eating, regular exercise, and other steps. Follow-up care is a key part of your treatment and safety. Be sure to make and go to all appointments, and call your doctor if you are having problems. It's also a good idea to know your test results and keep a list of the medicines you take. How can you care for yourself at home? · Reach and stay at a healthy weight. This will lower your risk for many problems, such as obesity, diabetes, heart disease, and high blood pressure. · Get at least 30 minutes of physical activity on most days of the week. Walking is a good choice. You also may want to do other activities, such as running, swimming, cycling, or playing tennis or team sports. Discuss any changes in your exercise program with your doctor. · Do not smoke or allow others to smoke around you. If you need help quitting, talk to your doctor about stop-smoking programs and medicines. These can increase your chances of quitting for good. · Talk to your doctor about whether you have any risk factors for sexually transmitted infections (STIs). Having one sex partner (who does not have STIs and does not have sex with anyone else) is a good way to avoid these infections. · Use birth control if you do not want to have children at this time.  Talk with your doctor about the choices available and what might be best for you. · Protect your skin from too much sun. When you're outdoors from 10 a.m. to 4 p.m., stay in the shade or cover up with clothing and a hat with a wide brim. Wear sunglasses that block UV rays. Even when it's cloudy, put broad-spectrum sunscreen (SPF 30 or higher) on any exposed skin. · See a dentist one or two times a year for checkups and to have your teeth cleaned. · Wear a seat belt in the car. · Drink alcohol in moderation, if at all. That means no more than 2 drinks a day for men and 1 drink a day for women. Follow your doctor's advice about when to have certain tests. These tests can spot problems early. For everyone · Cholesterol. Have the fat (cholesterol) in your blood tested after age 21. Your doctor will tell you how often to have this done based on your age, family history, or other things that can increase your risk for heart disease. · Blood pressure. Have your blood pressure checked during a routine doctor visit. Your doctor will tell you how often to check your blood pressure based on your age, your blood pressure results, and other factors. · Vision. Talk with your doctor about how often to have a glaucoma test. 
· Diabetes. Ask your doctor whether you should have tests for diabetes. · Colon cancer. Have a test for colon cancer at age 48. You may have one of several tests. If you are younger than 48, you may need a test earlier if you have any risk factors. Risk factors include whether you already had a precancerous polyp removed from your colon or whether your parent, brother, sister, or child has had colon cancer. For women · Breast exam and mammogram. Talk to your doctor about when you should have a clinical breast exam and a mammogram. Medical experts differ on whether and how often women under 50 should have these tests. Your doctor can help you decide what is right for you. · Pap test and pelvic exam. Begin Pap tests at age 24. A Pap test is the best way to find cervical cancer. The test often is part of a pelvic exam. Ask how often to have this test. 
· Tests for sexually transmitted infections (STIs). Ask whether you should have tests for STIs. You may be at risk if you have sex with more than one person, especially if your partners do not wear condoms. For men · Tests for sexually transmitted infections (STIs). Ask whether you should have tests for STIs. You may be at risk if you have sex with more than one person, especially if you do not wear a condom. · Testicular cancer exam. Ask your doctor whether you should check your testicles regularly. · Prostate exam. Talk to your doctor about whether you should have a blood test (called a PSA test) for prostate cancer. Experts differ on whether and when men should have this test. Some experts suggest it if you are older than 39 and are -American or have a father or brother who got prostate cancer when he was younger than 72. When should you call for help? Watch closely for changes in your health, and be sure to contact your doctor if you have any problems or symptoms that concern you. Where can you learn more? Go to http://liya-valerio.info/. Enter P072 in the search box to learn more about \"Well Visit, Ages 25 to 48: Care Instructions. \" Current as of: July 19, 2016 Content Version: 11.3 © 9716-7632 [employer]. Care instructions adapted under license by [employer] (which disclaims liability or warranty for this information). If you have questions about a medical condition or this instruction, always ask your healthcare professional. [place] [address] any warranty or liability for your use of this information. Introducing [facility]! Dear Kelsy Larger: 
Thank you for requesting a [employer] account.   Our records indicate that you have previously registered for a [employer] account but its currently inactive. Please call our [employer] support line at [phone]. Additional Information If you have questions, please visit the Frequently Asked Questions section of the [employer] website at [email or web address] Remember, [employer] is NOT to be used for urgent needs. For medical emergencies, dial 911. Now available from your iPhone and Android! Please provide this summary of care documentation to your next provider. Your primary care clinician is listed as Leonor Vanegas. If you have any questions after today's visit, please call [phone].

## 2017-08-23 LAB
25(OH)D3+25(OH)D2 SERPL-MCNC: 28 NG/ML (ref 30–100)
ALBUMIN SERPL-MCNC: 4.1 G/DL (ref 3.5–5.5)
ALBUMIN/GLOB SERPL: 1.4 {RATIO} (ref 1.2–2.2)
ALP SERPL-CCNC: 92 IU/L (ref 39–117)
ALT SERPL-CCNC: 13 IU/L (ref 0–32)
AST SERPL-CCNC: 20 IU/L (ref 0–40)
BILIRUB SERPL-MCNC: <0.2 MG/DL (ref 0–1.2)
BUN SERPL-MCNC: 6 MG/DL (ref 6–24)
BUN/CREAT SERPL: 10 (ref 9–23)
CALCIUM SERPL-MCNC: 9.5 MG/DL (ref 8.7–10.2)
CHLORIDE SERPL-SCNC: 100 MMOL/L (ref 96–106)
CHOLEST SERPL-MCNC: 209 MG/DL (ref 100–199)
CO2 SERPL-SCNC: 24 MMOL/L (ref 18–29)
CREAT SERPL-MCNC: 0.61 MG/DL (ref 0.57–1)
GLOBULIN SER CALC-MCNC: 3 G/DL (ref 1.5–4.5)
GLUCOSE SERPL-MCNC: 103 MG/DL (ref 65–99)
HDLC SERPL-MCNC: 47 MG/DL
INTERPRETATION, 910389: NORMAL
LDLC SERPL CALC-MCNC: 143 MG/DL (ref 0–99)
POTASSIUM SERPL-SCNC: 4.7 MMOL/L (ref 3.5–5.2)
PROT SERPL-MCNC: 7.1 G/DL (ref 6–8.5)
SODIUM SERPL-SCNC: 141 MMOL/L (ref 134–144)
TRIGL SERPL-MCNC: 95 MG/DL (ref 0–149)
VLDLC SERPL CALC-MCNC: 19 MG/DL (ref 5–40)

## 2017-08-23 NOTE — PROGRESS NOTES
Sent in letter:  Cholesterol is still a little high on the pravastatin, but given your side effects with a stronger medication (lipitor), I think you should stick with this one and really aim to quit smoking. Kidney, liver, sugar, and electrolytes were fine. Vitamin D was ok, but you should stay on a daily vitamin D supplement to keep your level up (either a multivitamin or a vitamin D3 1000 unit pill).

## 2017-08-25 ENCOUNTER — OFFICE VISIT (OUTPATIENT)
Dept: FAMILY MEDICINE CLINIC | Age: 47
End: 2017-08-25

## 2017-08-25 VITALS
HEART RATE: 83 BPM | OXYGEN SATURATION: 97 % | TEMPERATURE: 97.8 F | DIASTOLIC BLOOD PRESSURE: 58 MMHG | BODY MASS INDEX: 28.31 KG/M2 | WEIGHT: 159.8 LBS | SYSTOLIC BLOOD PRESSURE: 97 MMHG | HEIGHT: 63 IN | RESPIRATION RATE: 18 BRPM

## 2017-08-25 DIAGNOSIS — H72.91 RUPTURED TYMPANIC MEMBRANE, RIGHT: Primary | ICD-10-CM

## 2017-08-25 DIAGNOSIS — H91.93 BILATERAL HEARING LOSS, UNSPECIFIED HEARING LOSS TYPE: ICD-10-CM

## 2017-08-25 DIAGNOSIS — H66.014 RECURRENT ACUTE SUPPURATIVE OTITIS MEDIA OF RIGHT EAR WITH SPONTANEOUS RUPTURE OF TYMPANIC MEMBRANE: ICD-10-CM

## 2017-08-25 DIAGNOSIS — Z72.0 TOBACCO ABUSE: ICD-10-CM

## 2017-08-25 RX ORDER — OFLOXACIN 3 MG/ML
5 SOLUTION AURICULAR (OTIC) DAILY
Qty: 5 ML | Refills: 0 | Status: SHIPPED | OUTPATIENT
Start: 2017-08-25 | End: 2017-11-14

## 2017-08-25 RX ORDER — AMOXICILLIN AND CLAVULANATE POTASSIUM 875; 125 MG/1; MG/1
1 TABLET, FILM COATED ORAL 2 TIMES DAILY
Qty: 14 TAB | Refills: 0 | Status: SHIPPED | OUTPATIENT
Start: 2017-08-25 | End: 2017-09-01

## 2017-08-25 NOTE — PROGRESS NOTES
Chief Complaint   Patient presents with    Ear Drainage     Patient states that right ear drum erupted around 1:00 am this morning, with some blood and puss discharge came out of the ear. RIght ear is also painful. 1. Have you been to the ER, urgent care clinic since your last visit? Hospitalized since your last visit? No    2. Have you seen or consulted any other health care providers outside of the [address] [place] [address] since your last visit? Include any pap smears or colon screening.  No

## 2017-08-25 NOTE — PATIENT INSTRUCTIONS
Perforated Eardrum: Care Instructions  Your Care Instructions    A tear or hole in the membrane of the middle ear is called a perforated or ruptured eardrum. This can happen if an infection builds up inside the ear or if the eardrum gets injured. You may find it hard to hear out of that ear or may hear a buzzing sound. You may have an earache or have fluids that drain from the ear. Your eardrum should heal on its own in a few weeks, and you should hear normally then. If you have an infection, your doctor may prescribe antibiotics. You may need pain relief medicine for your earache. Your doctor will check to see if your eardrum has healed. If not, you may need surgery to repair the eardrum. Follow-up care is a key part of your treatment and safety. Be sure to make and go to all appointments, and call your doctor if you are having problems. It's also a good idea to know your test results and keep a list of the medicines you take. How can you care for yourself at home? · If your doctor prescribed antibiotics, take them as directed. Do not stop taking them just because you feel better. You need to take the full course of antibiotics. · Take an over-the-counter pain medicine, such as acetaminophen (Tylenol), ibuprofen (Advil, Motrin), or naproxen (Aleve), as needed. Read and follow all instructions on the label. · Do not take two or more pain medicines at the same time unless the doctor told you to. Many pain medicines have acetaminophen, which is Tylenol. Too much acetaminophen (Tylenol) can be harmful. · To ease pain, put a warm washcloth or a heating pad set on low on your ear. You may have some drainage from the ear. · Be careful when taking over-the-counter cold or flu medicines and Tylenol at the same time. Many of these medicines have acetaminophen, which is Tylenol. Read the labels to make sure that you are not taking more than the recommended dose. Too much Tylenol can be harmful.   · Keep your ears dry.  ¨ Take baths until your doctor says you can take showers again. ¨ When you wash your hair, use cotton lightly coated with petroleum jelly as an earplug. Do not use plastic earplugs. ¨ Do not swim until your doctor says you can. ¨ If you get water in your ears, turn your head to each side and pull the earlobe in different directions. This will help the water run out. If your ears are still wet, use a hair dryer set on the lowest heat. Hold the dryer several inches from your ear. · Do not put anything into your ear canal. For example, do not use a cotton swab to clean the inside of your ear. It can damage your ear. If you think you have something inside your ear, ask your doctor to check it. When should you call for help? Call your doctor now or seek immediate medical care if:  · You have signs of infection, such as:  ¨ Increased pain, swelling, warmth, or redness. ¨ Pus draining from the ear. ¨ A fever. Watch closely for changes in your health, and be sure to contact your doctor if:  · You have changes in hearing. · You do not get better as expected. Where can you learn more? Go to http://liya-valerio.info/. Enter P052 in the search box to learn more about \"Perforated Eardrum: Care Instructions. \"  Current as of: March 20, 2017  Content Version: 11.3  © 9176-7330 [employer]. Care instructions adapted under license by [employer] (which disclaims liability or warranty for this information). If you have questions about a medical condition or this instruction, always ask your healthcare professional. [place] [address] any warranty or liability for your use of this information.
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
## 2017-08-25 NOTE — MR AVS SNAPSHOT
Visit Information Date & Time Provider Department Dept. Phone Encounter #  
 8/25/2017  7:00 PM Dixon Ramírez, [address] [phone] [id number] Follow-up Instructions Return in about 6 months (around 2/25/2018). Your Appointments 9/18/2017  9:20 AM  
[facility] with EF[facility]UL[facility] ([facility] SCHEDULING) Appt Note: 1 month INR  
 [address] [facility] [address] [place]  
[address] [facility]  
  
    
 12/4/2017  8:40 AM  
ESTABLISHED PATIENT with Tanja Briones MD  
[facility] ([address]) Appt Note: 6 mo f/u  
 [address] [place]  
[address] [address] Upcoming Health Maintenance Date Due INFLUENZA AGE 9 TO ADULT 8/1/2017 PAP AKA CERVICAL CYTOLOGY 5/10/2018 DTaP/Tdap/Td series (2 - Td) 8/1/2023 Allergies as of 8/25/2017  Review Complete On: 8/25/2017 By: Dixon Ramírez MD  
  
 Severity Noted Reaction Type Reactions Contrast Dye [Iodine] High 03/02/2010   Systemic Anaphylaxis Chantix [Varenicline]  08/22/2017   Intolerance Other (comments) Psych side effects Lipitor [Atorvastatin]  03/21/2014    Myalgia And GI upset Percocet [Oxycodone-acetaminophen]  09/24/2015    Other (comments) Dizziness/fall Prozac [Fluoxetine]  02/21/2017    Other (comments) QT prolongation Sulfa (Sulfonamide Antibiotics)    Other (comments) Gi upset Tramadol  09/29/2016    Rash Vicoprofen [Hydrocodone-ibuprofen]  06/25/2014    Nausea and Vomiting Can take hydrocodone and ibuprofen separately, but not together Wellbutrin [Bupropion]  08/22/2017   Intolerance Other (comments)  
 tonny Current Immunizations  Reviewed on 8/9/2017 Name Date Influenza Vaccine 11/13/2015 Influenza Vaccine Doughertyjosr Mehta) 11/16/2016 Influenza Vaccine (Quad) PF 1/16/2017  1:10 PM  
 Influenza Vaccine Whole 11/23/2007 Pneumococcal Vaccine (Unspecified Type) 11/4/2009 Tdap 8/1/2013 Not reviewed this visit You Were Diagnosed With   
  
 Codes Comments Ruptured tympanic membrane, right    -  Primary ICD-10-CM: H72.91 
ICD-9-CM: 384.20 Recurrent acute suppurative otitis media of right ear with spontaneous rupture of tympanic membrane     ICD-10-CM: H66.014 
ICD-9-CM: 382.01 Tobacco abuse     ICD-10-CM: Z72.0 ICD-9-CM: 305.1 Vitals BP Pulse Temp Resp Height(growth percentile) Weight(growth percentile) 97/58 (BP 1 Location: Left arm, BP Patient Position: Sitting) 83 97.8 °F (36.6 °C) (Oral) 18 5' 3\" (1.6 m) 159 lb 12.8 oz (72.5 kg) SpO2 BMI OB Status Smoking Status 97% 28.31 kg/m2 Hysterectomy Current Every Day Smoker BMI and BSA Data Body Mass Index Body Surface Area  
 28.31 kg/m 2 1.8 m 2 Preferred Pharmacy Pharmacy Name Phone [facility] [address] [place] [address] [place]. [phone] Your Updated Medication List  
  
   
This list is accurate as of: 8/25/17  7:28 PM.  Always use your most recent med list.  
  
  
  
  
 amoxicillin-clavulanate 875-125 mg per tablet Commonly known as:  AUGMENTIN Take 1 Tab by mouth two (2) times a day for 7 days. aspirin 81 mg chewable tablet Take 1 Tab by mouth daily. ofloxacin 0.3 % otic solution Commonly known as:  FLOXIN Administer 5 Drops in right ear daily. pravastatin 40 mg tablet Commonly known as:  PRAVACHOL Take 1 Tab by mouth nightly. therapeutic multivitamin tablet Commonly known as:  [facility] Take 1 Tab by mouth daily. venlafaxine-SR 75 mg capsule Commonly known as:  EFFEXOR-XR Take 1 Cap by mouth daily. warfarin 1 mg tablet Commonly known as:  COUMADIN  
 4 tablets daily except 5 tablets on Wednesday and Saturday. ZyrTEC 10 mg tablet Generic drug:  cetirizine Take 10 mg by mouth as needed. Prescriptions Sent to Pharmacy Refills  
 ofloxacin (FLOXIN) 0.3 % otic solution 0 Sig: Administer 5 Drops in right ear daily. Class: Normal  
 Pharmacy: [facility] [address] [place] [address], [address] [place]. Ph #: [phone] Route: Right Ear  
 amoxicillin-clavulanate (AUGMENTIN) 875-125 mg per tablet 0 Sig: Take 1 Tab by mouth two (2) times a day for 7 days. Class: Normal  
 Pharmacy: [facility] [address] [place] [address], [address] [facility]. Ph #: [phone] Route: Oral  
  
Follow-up Instructions Return in about 6 months (around 2/25/2018). Patient Instructions Perforated Eardrum: Care Instructions Your Care Instructions A tear or hole in the membrane of the middle ear is called a perforated or ruptured eardrum. This can happen if an infection builds up inside the ear or if the eardrum gets injured. You may find it hard to hear out of that ear or may hear a buzzing sound. You may have an earache or have fluids that drain from the ear. Your eardrum should heal on its own in a few weeks, and you should hear normally then. If you have an infection, your doctor may prescribe antibiotics. You may need pain relief medicine for your earache. Your doctor will check to see if your eardrum has healed. If not, you may need surgery to repair the eardrum. Follow-up care is a key part of your treatment and safety. Be sure to make and go to all appointments, and call your doctor if you are having problems. It's also a good idea to know your test results and keep a list of the medicines you take. How can you care for yourself at home? · If your doctor prescribed antibiotics, take them as directed.  Do not stop taking them just because you feel better. You need to take the full course of antibiotics. · Take an over-the-counter pain medicine, such as acetaminophen (Tylenol), ibuprofen (Advil, Motrin), or naproxen (Aleve), as needed. Read and follow all instructions on the label. · Do not take two or more pain medicines at the same time unless the doctor told you to. Many pain medicines have acetaminophen, which is Tylenol. Too much acetaminophen (Tylenol) can be harmful. · To ease pain, put a warm washcloth or a heating pad set on low on your ear. You may have some drainage from the ear. · Be careful when taking over-the-counter cold or flu medicines and Tylenol at the same time. Many of these medicines have acetaminophen, which is Tylenol. Read the labels to make sure that you are not taking more than the recommended dose. Too much Tylenol can be harmful. · Keep your ears dry. ¨ Take baths until your doctor says you can take showers again. ¨ When you wash your hair, use cotton lightly coated with petroleum jelly as an earplug. Do not use plastic earplugs. ¨ Do not swim until your doctor says you can. ¨ If you get water in your ears, turn your head to each side and pull the earlobe in different directions. This will help the water run out. If your ears are still wet, use a hair dryer set on the lowest heat. Hold the dryer several inches from your ear. · Do not put anything into your ear canal. For example, do not use a cotton swab to clean the inside of your ear. It can damage your ear. If you think you have something inside your ear, ask your doctor to check it. When should you call for help? Call your doctor now or seek immediate medical care if: 
· You have signs of infection, such as: 
¨ Increased pain, swelling, warmth, or redness. ¨ Pus draining from the ear. ¨ A fever. Watch closely for changes in your health, and be sure to contact your doctor if: 
· You have changes in hearing. · You do not get better as expected. Where can you learn more? Go to http://liya-valerio.info/. Enter S051 in the search box to learn more about \"Perforated Eardrum: Care Instructions. \" Current as of: March 20, 2017 Content Version: 11.3 © 9737-8685 [employer]. Care instructions adapted under license by [employer] (which disclaims liability or warranty for this information). If you have questions about a medical condition or this instruction, always ask your healthcare professional. [address] any warranty or liability for your use of this information. Introducing hospitals & HEALTH SERVICES! Dear Steve Guallpa: 
Thank you for requesting a Blink Messenger account. Our records indicate that you have previously registered for a Blink Messenger account but its currently inactive. Please call our Blink Messenger support line at [phone]. Additional Information If you have questions, please visit the Frequently Asked Questions section of the Blink Messenger website at [email or web address]. [employer]/. Remember, Blink Messenger is NOT to be used for urgent needs. For medical emergencies, dial 911. Now available from your iPhone and Android! Please provide this summary of care documentation to your next provider. Your primary care clinician is listed as Jimi Peres. If you have any questions after today's visit, please call [phone].

## 2017-09-18 ENCOUNTER — CLINICAL SUPPORT (OUTPATIENT)
Dept: CARDIOLOGY CLINIC | Age: 47
End: 2017-09-18

## 2017-09-18 DIAGNOSIS — Z95.2 S/P AVR (AORTIC VALVE REPLACEMENT): ICD-10-CM

## 2017-09-18 DIAGNOSIS — Z79.01 LONG TERM (CURRENT) USE OF ANTICOAGULANTS: Primary | ICD-10-CM

## 2017-09-18 LAB
INR BLD: NORMAL
INR, EXTERNAL: 2.3 (ref 2–3)
PT POC: NORMAL SECONDS
VALID INTERNAL CONTROL?: YES

## 2017-09-18 NOTE — PROGRESS NOTES
A full discussion of the nature of anticoagulants has been carried out. A benefit risk analysis has been presented to the patient, so that they understand the justification for choosing anticoagulation at this time. The need for frequent and regular monitoring, precise dosage adjustment and compliance is stressed. Side effects of potential bleeding are discussed. The patient should avoid any OTC items containing aspirin or ibuprofen, and should avoid great swings in general diet. Avoid alcohol consumption. Call if any signs of abnormal bleeding. Next PT/INR test in 1 month. No change in dosing regimen. Discussed goal of 2-3 with aortic valve. Patient skeptical.  States has always had goal of 2.5-3.5. To take extra mg tablet tonight and tomorrow then resume current dosing regimen.

## 2017-10-16 ENCOUNTER — CLINICAL SUPPORT (OUTPATIENT)
Dept: CARDIOLOGY CLINIC | Age: 47
End: 2017-10-16

## 2017-10-16 DIAGNOSIS — Z79.01 LONG-TERM (CURRENT) USE OF ANTICOAGULANTS: Primary | ICD-10-CM

## 2017-10-16 DIAGNOSIS — Z95.2 S/P AVR (AORTIC VALVE REPLACEMENT): ICD-10-CM

## 2017-10-16 LAB
INR BLD: NORMAL
INR, EXTERNAL: 1.5 (ref 2.5–3.5)
PT POC: NORMAL SECONDS
VALID INTERNAL CONTROL?: YES

## 2017-10-16 NOTE — PROGRESS NOTES
A full discussion of the nature of anticoagulants has been carried out. A benefit risk analysis has been presented to the patient, so that they understand the justification for choosing anticoagulation at this time. The need for frequent and regular monitoring, precise dosage adjustment and compliance is stressed. Side effects of potential bleeding are discussed. The patient should avoid any OTC items containing aspirin or ibuprofen, and should avoid great swings in general diet. Avoid alcohol consumption. Call if any signs of abnormal bleeding. Next PT/INR test in 1 week. States missed dose. No change in diet. States missed dose. To take 6 mg x 3 days then resume current dosing regimen. To recheck INR in 1 week to verify therapeutic. Patient requests that goal of INR be 2.5-3.5.

## 2017-10-23 ENCOUNTER — CLINICAL SUPPORT (OUTPATIENT)
Dept: CARDIOLOGY CLINIC | Age: 47
End: 2017-10-23

## 2017-10-23 DIAGNOSIS — Z79.01 LONG-TERM (CURRENT) USE OF ANTICOAGULANTS: Primary | ICD-10-CM

## 2017-10-23 DIAGNOSIS — Z95.2 S/P AVR (AORTIC VALVE REPLACEMENT): ICD-10-CM

## 2017-10-23 LAB
INR BLD: NORMAL
INR, EXTERNAL: 3 (ref 2–3)
PT POC: NORMAL SECONDS
VALID INTERNAL CONTROL?: YES

## 2017-11-14 ENCOUNTER — HOSPITAL ENCOUNTER (OUTPATIENT)
Age: 47
Setting detail: OBSERVATION
Discharge: HOME | End: 2017-11-17
Attending: EMERGENCY MEDICINE | Admitting: HOSPITALIST
Payer: COMMERCIAL

## 2017-11-14 ENCOUNTER — APPOINTMENT (OUTPATIENT)
Dept: CT IMAGING | Age: 47
End: 2017-11-14
Attending: PHYSICIAN ASSISTANT
Payer: COMMERCIAL

## 2017-11-14 DIAGNOSIS — Z95.2 HISTORY OF MECHANICAL AORTIC VALVE REPLACEMENT: Primary | ICD-10-CM

## 2017-11-14 DIAGNOSIS — M79.671 BILATERAL FOOT PAIN: ICD-10-CM

## 2017-11-14 DIAGNOSIS — M79.672 BILATERAL FOOT PAIN: ICD-10-CM

## 2017-11-14 PROBLEM — L03.90 CELLULITIS: Status: ACTIVE | Noted: 2017-11-14

## 2017-11-14 LAB
ALBUMIN SERPL-MCNC: 4.1 G/DL (ref 3.5–5)
ALBUMIN/GLOB SERPL: 1 {RATIO} (ref 1.1–2.2)
ALP SERPL-CCNC: 92 U/L (ref 45–117)
ALT SERPL-CCNC: 22 U/L (ref 12–78)
ANION GAP SERPL CALC-SCNC: 6 MMOL/L (ref 5–15)
AST SERPL-CCNC: 21 U/L (ref 15–37)
BASOPHILS # BLD: 0 K/UL (ref 0–0.1)
BASOPHILS NFR BLD: 0 % (ref 0–1)
BILIRUB SERPL-MCNC: 0.4 MG/DL (ref 0.2–1)
BUN SERPL-MCNC: 9 MG/DL (ref 6–20)
BUN/CREAT SERPL: 13 (ref 12–20)
CALCIUM SERPL-MCNC: 9.1 MG/DL (ref 8.5–10.1)
CHLORIDE SERPL-SCNC: 104 MMOL/L (ref 97–108)
CO2 SERPL-SCNC: 26 MMOL/L (ref 21–32)
CREAT SERPL-MCNC: 0.69 MG/DL (ref 0.55–1.02)
CRP SERPL-MCNC: 1.32 MG/DL (ref 0–0.6)
EOSINOPHIL # BLD: 0.1 K/UL (ref 0–0.4)
EOSINOPHIL NFR BLD: 1 % (ref 0–7)
ERYTHROCYTE [DISTWIDTH] IN BLOOD BY AUTOMATED COUNT: 13 % (ref 11.5–14.5)
ERYTHROCYTE [SEDIMENTATION RATE] IN BLOOD: 9 MM/HR (ref 0–20)
GLOBULIN SER CALC-MCNC: 4.2 G/DL (ref 2–4)
GLUCOSE SERPL-MCNC: 76 MG/DL (ref 65–100)
HCT VFR BLD AUTO: 43 % (ref 35–47)
HGB BLD-MCNC: 15 G/DL (ref 11.5–16)
INR PPP: 2.6 (ref 0.9–1.1)
LACTATE SERPL-SCNC: 0.7 MMOL/L (ref 0.4–2)
LYMPHOCYTES # BLD: 3.3 K/UL (ref 0.8–3.5)
LYMPHOCYTES NFR BLD: 29 % (ref 12–49)
MCH RBC QN AUTO: 30.7 PG (ref 26–34)
MCHC RBC AUTO-ENTMCNC: 34.9 G/DL (ref 30–36.5)
MCV RBC AUTO: 87.9 FL (ref 80–99)
MONOCYTES # BLD: 1 K/UL (ref 0–1)
MONOCYTES NFR BLD: 9 % (ref 5–13)
NEUTS SEG # BLD: 7 K/UL (ref 1.8–8)
NEUTS SEG NFR BLD: 61 % (ref 32–75)
PLATELET # BLD AUTO: 268 K/UL (ref 150–400)
POTASSIUM SERPL-SCNC: 4 MMOL/L (ref 3.5–5.1)
PROT SERPL-MCNC: 8.3 G/DL (ref 6.4–8.2)
PROTHROMBIN TIME: 26.6 SEC (ref 9–11.1)
RBC # BLD AUTO: 4.89 M/UL (ref 3.8–5.2)
SODIUM SERPL-SCNC: 136 MMOL/L (ref 136–145)
TROPONIN I SERPL-MCNC: <0.04 NG/ML
WBC # BLD AUTO: 11.5 K/UL (ref 3.6–11)

## 2017-11-14 PROCEDURE — 85025 COMPLETE CBC W/AUTO DIFF WBC: CPT | Performed by: PHYSICIAN ASSISTANT

## 2017-11-14 PROCEDURE — 99218 HC RM OBSERVATION: CPT

## 2017-11-14 PROCEDURE — 96361 HYDRATE IV INFUSION ADD-ON: CPT

## 2017-11-14 PROCEDURE — 36415 COLL VENOUS BLD VENIPUNCTURE: CPT | Performed by: PHYSICIAN ASSISTANT

## 2017-11-14 PROCEDURE — 84484 ASSAY OF TROPONIN QUANT: CPT | Performed by: PHYSICIAN ASSISTANT

## 2017-11-14 PROCEDURE — 85652 RBC SED RATE AUTOMATED: CPT | Performed by: PHYSICIAN ASSISTANT

## 2017-11-14 PROCEDURE — 74011250637 HC RX REV CODE- 250/637: Performed by: HOSPITALIST

## 2017-11-14 PROCEDURE — 99285 EMERGENCY DEPT VISIT HI MDM: CPT

## 2017-11-14 PROCEDURE — 87040 BLOOD CULTURE FOR BACTERIA: CPT | Performed by: PHYSICIAN ASSISTANT

## 2017-11-14 PROCEDURE — 85610 PROTHROMBIN TIME: CPT | Performed by: PHYSICIAN ASSISTANT

## 2017-11-14 PROCEDURE — 96360 HYDRATION IV INFUSION INIT: CPT

## 2017-11-14 PROCEDURE — 74011250637 HC RX REV CODE- 250/637: Performed by: PHYSICIAN ASSISTANT

## 2017-11-14 PROCEDURE — 74011250636 HC RX REV CODE- 250/636: Performed by: HOSPITALIST

## 2017-11-14 PROCEDURE — 74011250636 HC RX REV CODE- 250/636: Performed by: PHYSICIAN ASSISTANT

## 2017-11-14 PROCEDURE — 80053 COMPREHEN METABOLIC PANEL: CPT | Performed by: PHYSICIAN ASSISTANT

## 2017-11-14 PROCEDURE — 83605 ASSAY OF LACTIC ACID: CPT | Performed by: PHYSICIAN ASSISTANT

## 2017-11-14 PROCEDURE — 93005 ELECTROCARDIOGRAM TRACING: CPT

## 2017-11-14 PROCEDURE — 86140 C-REACTIVE PROTEIN: CPT | Performed by: PHYSICIAN ASSISTANT

## 2017-11-14 RX ORDER — WARFARIN 4 MG/1
4 TABLET ORAL ONCE
Status: DISCONTINUED | OUTPATIENT
Start: 2017-11-14 | End: 2017-11-14 | Stop reason: CLARIF

## 2017-11-14 RX ORDER — SODIUM CHLORIDE 9 MG/ML
75 INJECTION, SOLUTION INTRAVENOUS CONTINUOUS
Status: DISCONTINUED | OUTPATIENT
Start: 2017-11-14 | End: 2017-11-17 | Stop reason: HOSPADM

## 2017-11-14 RX ORDER — COLCHICINE 0.6 MG/1
0.6 TABLET ORAL
Status: COMPLETED | OUTPATIENT
Start: 2017-11-14 | End: 2017-11-14

## 2017-11-14 RX ORDER — WARFARIN 1 MG/1
5 TABLET ORAL
COMMUNITY
End: 2017-12-04 | Stop reason: SDUPTHER

## 2017-11-14 RX ORDER — HYDROCODONE BITARTRATE AND ACETAMINOPHEN 5; 325 MG/1; MG/1
1 TABLET ORAL
Status: COMPLETED | OUTPATIENT
Start: 2017-11-14 | End: 2017-11-14

## 2017-11-14 RX ORDER — SODIUM CHLORIDE 0.9 % (FLUSH) 0.9 %
5-10 SYRINGE (ML) INJECTION AS NEEDED
Status: DISCONTINUED | OUTPATIENT
Start: 2017-11-14 | End: 2017-11-17 | Stop reason: HOSPADM

## 2017-11-14 RX ORDER — CEPHALEXIN 500 MG/1
500 CAPSULE ORAL EVERY 6 HOURS
Status: DISCONTINUED | OUTPATIENT
Start: 2017-11-14 | End: 2017-11-17 | Stop reason: HOSPADM

## 2017-11-14 RX ORDER — SODIUM CHLORIDE 0.9 % (FLUSH) 0.9 %
5-10 SYRINGE (ML) INJECTION EVERY 8 HOURS
Status: DISCONTINUED | OUTPATIENT
Start: 2017-11-14 | End: 2017-11-17 | Stop reason: HOSPADM

## 2017-11-14 RX ORDER — WARFARIN 1 MG/1
4 TABLET ORAL DAILY
COMMUNITY
End: 2017-12-18 | Stop reason: SDUPTHER

## 2017-11-14 RX ORDER — VENLAFAXINE HYDROCHLORIDE 37.5 MG/1
75 CAPSULE, EXTENDED RELEASE ORAL DAILY
Status: DISCONTINUED | OUTPATIENT
Start: 2017-11-14 | End: 2017-11-17 | Stop reason: HOSPADM

## 2017-11-14 RX ORDER — WARFARIN SODIUM 5 MG/1
5 TABLET ORAL ONCE
Status: COMPLETED | OUTPATIENT
Start: 2017-11-14 | End: 2017-11-14

## 2017-11-14 RX ORDER — PRAVASTATIN SODIUM 40 MG/1
40 TABLET ORAL
Status: DISCONTINUED | OUTPATIENT
Start: 2017-11-14 | End: 2017-11-17 | Stop reason: HOSPADM

## 2017-11-14 RX ORDER — WARFARIN 1 MG/1
4 TABLET ORAL
COMMUNITY
End: 2017-12-04 | Stop reason: SDUPTHER

## 2017-11-14 RX ADMIN — CEPHALEXIN 500 MG: 500 CAPSULE ORAL at 21:07

## 2017-11-14 RX ADMIN — Medication 10 ML: at 21:07

## 2017-11-14 RX ADMIN — SODIUM CHLORIDE 75 ML/HR: 900 INJECTION, SOLUTION INTRAVENOUS at 22:33

## 2017-11-14 RX ADMIN — PRAVASTATIN SODIUM 40 MG: 40 TABLET ORAL at 21:07

## 2017-11-14 RX ADMIN — HYDROCODONE BITARTRATE AND ACETAMINOPHEN 1 TABLET: 5; 325 TABLET ORAL at 16:14

## 2017-11-14 RX ADMIN — SODIUM CHLORIDE 1000 ML: 900 INJECTION, SOLUTION INTRAVENOUS at 16:47

## 2017-11-14 RX ADMIN — WARFARIN SODIUM 5 MG: 5 TABLET ORAL at 18:56

## 2017-11-14 RX ADMIN — COLCHICINE 0.6 MG: 0.6 TABLET, FILM COATED ORAL at 18:00

## 2017-11-14 NOTE — IP AVS SNAPSHOT
[address] [facility], [place] [address] 
[phone] Patient: Ger Mendez MRN: [id number] [id number] My Medications TAKE these medications as instructed Instructions Each Dose to Equal  
 Morning Noon Evening Bedtime  
 cephALEXin 500 mg capsule Commonly known as:  Tyrell Drum Your last dose was: Your next dose is: Take 1 Cap by mouth every six (6) hours. 500 mg  
    
   
   
   
  
 * COUMADIN 1 mg tablet Generic drug:  warfarin Your last dose was: Your next dose is: Take 4 mg by mouth every , Monday, Thursday. 4 mg * COUMADIN 1 mg tablet Generic drug:  warfarin Your last dose was: Your next dose is: Take 4 mg by mouth every Tuesday and Friday. 4 mg * COUMADIN 1 mg tablet Generic drug:  warfarin Your last dose was: Your next dose is: Take 5 mg by mouth two (2) times a week on Wednesday and Saturday. 5 mg HYDROcodone-acetaminophen 5-325 mg per tablet Commonly known as:  Nasrin Agarwal Your last dose was: Your next dose is: Take 1 Tab by mouth every four (4) hours as needed. Max Daily Amount: 6 Tabs. 1 Tab  
    
   
   
   
  
 nicotine 14 mg/24 hr patch Commonly known as:  Holley Bolus Your last dose was: Your next dose is:    
   
   
 14 Patches by TransDERmal route every twenty-four (24) hours for 30 days. 14 Patch  
    
   
   
   
  
 pravastatin 40 mg tablet Commonly known as:  PRAVACHOL Your last dose was: Your next dose is: Take 1 Tab by mouth nightly. 40 mg  
    
   
   
   
  
 venlafaxine-SR 75 mg capsule Commonly known as:  EFFEXOR-XR Your last dose was: Your next dose is: Take 1 Cap by mouth daily.   
 75 mg  
    
   
   
   
  
 ZyrTEC 10 mg tablet Generic drug:  cetirizine Your last dose was: Your next dose is: Take 10 mg by mouth as needed. 10 mg  
    
   
   
   
  
 * Notice: This list has 3 medication(s) that are the same as other medications prescribed for you. Read the directions carefully, and ask your doctor or other care provider to review them with you. Where to Get Your Medications Information on where to get these meds will be given to you by the nurse or doctor. ! Ask your nurse or doctor about these medications  
  cephALEXin 500 mg capsule HYDROcodone-acetaminophen 5-325 mg per tablet  
 nicotine 14 mg/24 hr patch

## 2017-11-14 NOTE — IP AVS SNAPSHOT
[address] [address] 
[phone] Patient: Shaw Bhatti MRN: [id number] About your hospitalization You were admitted on:  November 14, 2017 You last received care in the:  [place] [address] You were discharged on:  November 17, 2017 Why you were hospitalized Your primary diagnosis was:  Cellulitis Things You Need To Do (next 8 weeks) Follow up with Baljit Noriega MD in 1 week(s) Phone:  [phone] Where:  [address], [address] [facility] [address] Monday Nov 20, 2017 COUMADIN CLINIC with EFREM JULIEN at  9:40 AM  
Where:  [address] ([facility]) Follow up with Ally Shepherd MD  
[address] [facility] appointment only on this day at 9:40 AM  
  
Phone:  [phone] Where:  [address] [facility] [address], [facility] [address] [address] Monday Dec 04, 2017 ESTABLISHED PATIENT with Ally Shepherd MD at  8:40 AM  
Where:  [address] ([facility]) Discharge Orders None A check caleb indicates which time of day the medication should be taken. My Medications TAKE these medications as instructed Instructions Each Dose to Equal  
 Morning Noon Evening Bedtime  
 cephALEXin 500 mg capsule Commonly known as:  Sylvia Andersona Your last dose was: Your next dose is: Take 1 Cap by mouth every six (6) hours. 500 mg  
    
   
   
   
  
 * COUMADIN 1 mg tablet Generic drug:  warfarin Your last dose was: Your next dose is: Take 4 mg by mouth every Sunday, Monday, Thursday. 4 mg * COUMADIN 1 mg tablet Generic drug:  warfarin Your last dose was: Your next dose is: Take 4 mg by mouth every Tuesday and Friday. 4 mg * COUMADIN 1 mg tablet Generic drug:  warfarin Your last dose was: Your next dose is: Take 5 mg by mouth two (2) times a week on Wednesday and Saturday. 5 mg HYDROcodone-acetaminophen 5-325 mg per tablet Commonly known as:  Benetta Pock Your last dose was: Your next dose is: Take 1 Tab by mouth every four (4) hours as needed. Max Daily Amount: 6 Tabs. 1 Tab  
    
   
   
   
  
 nicotine 14 mg/24 hr patch Commonly known as:  Elena Feather Your last dose was: Your next dose is:    
   
   
 14 Patches by TransDERmal route every twenty-four (24) hours for 30 days. 14 Patch  
    
   
   
   
  
 pravastatin 40 mg tablet Commonly known as:  PRAVACHOL Your last dose was: Your next dose is: Take 1 Tab by mouth nightly. 40 mg  
    
   
   
   
  
 venlafaxine-SR 75 mg capsule Commonly known as:  EFFEXOR-XR Your last dose was: Your next dose is: Take 1 Cap by mouth daily. 75 mg  
    
   
   
   
  
 ZyrTEC 10 mg tablet Generic drug:  cetirizine Your last dose was: Your next dose is: Take 10 mg by mouth as needed. 10 mg  
    
   
   
   
  
 * Notice: This list has 3 medication(s) that are the same as other medications prescribed for you. Read the directions carefully, and ask your doctor or other care provider to review them with you. Where to Get Your Medications Information on where to get these meds will be given to you by the nurse or doctor. ! Ask your nurse or doctor about these medications  
  cephALEXin 500 mg capsule HYDROcodone-acetaminophen 5-325 mg per tablet  
 nicotine 14 mg/24 hr patch Discharge Instructions Cellulitis: Care Instructions Your Care Instructions Cellulitis is a skin infection.  It often occurs after a break in the skin from a scrape, cut, bite, or puncture, or after a rash. The doctor has checked you carefully, but problems can develop later. If you notice any problems or new symptoms, get medical treatment right away. Follow-up care is a key part of your treatment and safety. Be sure to make and go to all appointments, and call your doctor if you are having problems. It's also a good idea to know your test results and keep a list of the medicines you take. How can you care for yourself at home? · Take your antibiotics as directed. Do not stop taking them just because you feel better. You need to take the full course of antibiotics. · Prop up the infected area on pillows to reduce pain and swelling. Try to keep the area above the level of your heart as often as you can. · If your doctor told you how to care for your wound, follow your doctor's instructions. If you did not get instructions, follow this general advice: ¨ Wash the wound with clean water 2 times a day. Don't use hydrogen peroxide or alcohol, which can slow healing. ¨ You may cover the wound with a thin layer of petroleum jelly, such as Vaseline, and a nonstick bandage. ¨ Apply more petroleum jelly and replace the bandage as needed. · Be safe with medicines. Take pain medicines exactly as directed. ¨ If the doctor gave you a prescription medicine for pain, take it as prescribed. ¨ If you are not taking a prescription pain medicine, ask your doctor if you can take an over-the-counter medicine. To prevent cellulitis in the future · Try to prevent cuts, scrapes, or other injuries to your skin. Cellulitis most often occurs where there is a break in the skin. · If you get a scrape, cut, mild burn, or bite, wash the wound with clean water as soon as you can to help avoid infection. Don't use hydrogen peroxide or alcohol, which can slow healing.  
· If you have swelling in your legs (edema), support stockings and good skin care may help prevent leg sores and cellulitis. · Take care of your feet, especially if you have diabetes or other conditions that increase the risk of infection. Wear shoes and socks. Do not go barefoot. If you have athlete's foot or other skin problems on your feet, talk to your doctor about how to treat them. When should you call for help? Call your doctor now or seek immediate medical care if: 
? · You have signs that your infection is getting worse, such as: 
¨ Increased pain, swelling, warmth, or redness. ¨ Red streaks leading from the area. ¨ Pus draining from the area. ¨ A fever. ? · You get a rash. ? Watch closely for changes in your health, and be sure to contact your doctor if: 
? · You are not getting better after 1 day (24 hours). ? · You do not get better as expected. Where can you learn more? Go to http://liya-valerio.info/. Miguel A Shepherd in the search box to learn more about \"Cellulitis: Care Instructions. \" Current as of: October 13, 2016 Content Version: 11.4 © 2136-2387 [employer]. Care instructions adapted under license by [employer] (which disclaims liability or warranty for this information). If you have questions about a medical condition or this instruction, always ask your healthcare professional. [place] [address] any warranty or liability for your use of this information. Foot Pain: Care Instructions Your Care Instructions Foot injuries that cause pain and swelling are fairly common. Almost all sports or home repair projects can cause a misstep that ends up as foot pain. Normal wear and tear, especially as you get older, also can cause foot pain. Most minor foot injuries will heal on their own, and home treatment is usually all you need to do. If you have a severe injury, you may need tests and treatment. Follow-up care is a key part of your treatment and safety.  Be sure to make and go to all appointments, and call your doctor if you are having problems. It's also a good idea to know your test results and keep a list of the medicines you take. How can you care for yourself at home? · Take pain medicines exactly as directed. ¨ If the doctor gave you a prescription medicine for pain, take it as prescribed. ¨ If you are not taking a prescription pain medicine, ask your doctor if you can take an over-the-counter medicine. · Rest and protect your foot. Take a break from any activity that may cause pain. · Put ice or a cold pack on your foot for 10 to 20 minutes at a time. Put a thin cloth between the ice and your skin. · Prop up the sore foot on a pillow when you ice it or anytime you sit or lie down during the next 3 days. Try to keep it above the level of your heart. This will help reduce swelling. · Your doctor may recommend that you wrap your foot with an elastic bandage. Keep your foot wrapped for as long as your doctor advises. · If your doctor recommends crutches, use them as directed. · Wear roomy footwear. · As soon as pain and swelling end, begin gentle exercises of your foot. Your doctor can tell you which exercises will help. When should you call for help? Call 911 anytime you think you may need emergency care. For example, call if: 
? · Your foot turns pale, white, blue, or cold. ?Call your doctor now or seek immediate medical care if: 
? · You cannot move or stand on your foot. ? · Your foot looks twisted or out of its normal position. ? · Your foot is not stable when you step down. ? · You have signs of infection, such as: 
¨ Increased pain, swelling, warmth, or redness. ¨ Red streaks leading from the sore area. ¨ Pus draining from a place on your foot. ¨ A fever. ? · Your foot is numb or tingly. ? Watch closely for changes in your health, and be sure to contact your doctor if: 
? · You do not get better as expected. ? · You have bruises from an injury that last longer than 2 weeks. Where can you learn more? Go to http://liya-valreio.info/. Enter H116 in the search box to learn more about \"Foot Pain: Care Instructions. \" Current as of: March 21, 2017 Content Version: 11.4 © 2572-8928 [employer]. Care instructions adapted under license by [employer] (which disclaims liability or warranty for this information). If you have questions about a medical condition or this instruction, always ask your healthcare professional. [address] any warranty or liability for your use of this information. DISCHARGE SUMMARY from Nurse PATIENT INSTRUCTIONS: 
 
After general anesthesia or intravenous sedation, for 24 hours or while taking prescription Narcotics: · Limit your activities · Do not drive and operate hazardous machinery · Do not make important personal or business decisions · Do  not drink alcoholic beverages · If you have not urinated within 8 hours after discharge, please contact your surgeon on call. Report the following to your surgeon: 
· Excessive pain, swelling, redness or odor of or around the surgical area · Temperature over 100.5 · Nausea and vomiting lasting longer than 4 hours or if unable to take medications · Any signs of decreased circulation or nerve impairment to extremity: change in color, persistent  numbness, tingling, coldness or increase pain · Any questions These are general instructions for a healthy lifestyle: No smoking/ No tobacco products/ Avoid exposure to second hand smoke Surgeon General's Warning:  Quitting smoking now greatly reduces serious risk to your health. Obesity, smoking, and sedentary lifestyle greatly increases your risk for illness A healthy diet, regular physical exercise & weight monitoring are important for maintaining a healthy lifestyle You may be retaining fluid if you have a history of heart failure or if you experience any of the following symptoms:  Weight gain of 3 pounds or more overnight or 5 pounds in a week, increased swelling in our hands or feet or shortness of breath while lying flat in bed. Please call your doctor as soon as you notice any of these symptoms; do not wait until your next office visit. Recognize signs and symptoms of STROKE: 
 
F-face looks uneven A-arms unable to move or move unevenly S-speech slurred or non-existent T-time-call 911 as soon as signs and symptoms begin-DO NOT go Back to bed or wait to see if you get better-TIME IS BRAIN. Warning Signs of HEART ATTACK Call 911 if you have these symptoms: 
? Chest discomfort. Most heart attacks involve discomfort in the center of the chest that lasts more than a few minutes, or that goes away and comes back. It can feel like uncomfortable pressure, squeezing, fullness, or pain. ? Discomfort in other areas of the upper body. Symptoms can include pain or discomfort in one or both arms, the back, neck, jaw, or stomach. ? Shortness of breath with or without chest discomfort. ? Other signs may include breaking out in a cold sweat, nausea, or lightheadedness. Don't wait more than five minutes to call [address]! Fast action can save your life. Calling 911 is almost always the fastest way to get lifesaving treatment. Emergency Medical Services staff can begin treatment when they arrive  up to an hour sooner than if someone gets to the hospital by car. The discharge information has been reviewed with the patient. The patient verbalized understanding. Discharge medications reviewed with the patient and appropriate educational materials and side effects teaching were provided. ___________________________________________________________________________________________________________________________________ Smoking Cessation Program: This is a free, phone/text/email based, smoking cessation program. The program is individualized to meet each patient's needs. To enroll use the link https://ha.BALALIKEA/ra/survey/5730 or text Brii Harper to 109 2156 from any smart phone. ipadio Announcement We are excited to announce that we are making your provider's discharge notes available to you in ipadio. You will see these notes when they are completed and signed by the physician that discharged you from your recent hospital stay. If you have any questions or concerns about any information you see in ipadio, please call the Health Information Department where you were seen or reach out to your Primary Care Provider for more information about your plan of care. Introducing [facility]! Dear Mahi Palomares: 
Thank you for requesting a ipadio account. Our records indicate that you have previously registered for a ipadio account but its currently inactive. Please call our ipadio support line at [phone]. Additional Information If you have questions, please visit the Frequently Asked Questions section of the ipadio website at [email or web address] - [email or web address]. [email or web address]/AgreeYa Mobility - Onvelop/. Remember, ipadio is NOT to be used for urgent needs. For medical emergencies, dial 911. Now available from your iPhone and Android! Unresulted Labs-Please follow up with your PCP about these lab tests Order Current Status CULTURE, BLOOD Preliminary result CULTURE, BLOOD Preliminary result CULTURE, BLOOD Preliminary result Providers Seen During Your Hospitalization Provider Specialty Primary office phone Mary Taylor MD Emergency Medicine [phone] Aleyda Gresham MD Hospitalist [phone] Severo Griffin, MD Family Practice [phone] Your Primary Care Physician (PCP) Primary Care Physician Office Phone Office Fax [place] [phone] You are allergic to the following Allergen Reactions Contrast Dye (Iodine) Anaphylaxis Chantix (Varenicline) Other (comments) Psych side effects Lipitor (Atorvastatin) Myalgia And GI upset Percocet (Oxycodone-Acetaminophen) Other (comments) Dizziness/fall Prozac (Fluoxetine) Other (comments) QT prolongation Sulfa (Sulfonamide Antibiotics) Other (comments) Gi upset Tramadol Rash Vicoprofen (Hydrocodone-Ibuprofen) Nausea and Vomiting Can take hydrocodone and ibuprofen separately, but not together Wellbutrin (Bupropion) Other (comments)  
 tonny Recent Documentation Height Weight BMI OB Status Smoking Status 1.6 m 69.7 kg 27.22 kg/m2 Hysterectomy Current Every Day Smoker Emergency Contacts Name Discharge Info Relation Home Work Mobile [address] [employer] CAREGIVER [3] Mother [14] [phone] Patient Belongings The following personal items are in your possession at time of discharge: 
  Dental Appliances: At home  Visual Aid: None      Home Medications: None   Jewelry: Necklace  Clothing: At bedside, Slippers, Socks, Pants, Pajamas, Shirt    Other Valuables: Cell Phone  Personal Items Sent to Safe: none Please provide this summary of care documentation to your next provider. Signatures-by signing, you are acknowledging that this After Visit Summary has been reviewed with you and you have received a copy. Patient Signature:  ____________________________________________________________ Date:  ____________________________________________________________  
  
Jake Gastelum Provider Signature:  ____________________________________________________________ Date:  ____________________________________________________________
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
## 2017-11-14 NOTE — ED TRIAGE NOTES
\"My mom thinks I have a blot clots\". Patient reports R foot pain. +Redness and swelling to top of foot.

## 2017-11-15 LAB
ANION GAP SERPL CALC-SCNC: 9 MMOL/L (ref 5–15)
ATRIAL RATE: 73 BPM
BUN SERPL-MCNC: 12 MG/DL (ref 6–20)
BUN/CREAT SERPL: 20 (ref 12–20)
CALCIUM SERPL-MCNC: 8.5 MG/DL (ref 8.5–10.1)
CALCULATED P AXIS, ECG09: 50 DEGREES
CALCULATED R AXIS, ECG10: 61 DEGREES
CALCULATED T AXIS, ECG11: 67 DEGREES
CHLORIDE SERPL-SCNC: 110 MMOL/L (ref 97–108)
CO2 SERPL-SCNC: 22 MMOL/L (ref 21–32)
CREAT SERPL-MCNC: 0.61 MG/DL (ref 0.55–1.02)
DIAGNOSIS, 93000: NORMAL
ERYTHROCYTE [DISTWIDTH] IN BLOOD BY AUTOMATED COUNT: 13 % (ref 11.5–14.5)
GLUCOSE SERPL-MCNC: 80 MG/DL (ref 65–100)
HCT VFR BLD AUTO: 40.1 % (ref 35–47)
HGB BLD-MCNC: 13.8 G/DL (ref 11.5–16)
INR PPP: 3.3 (ref 0.9–1.1)
MAGNESIUM SERPL-MCNC: 2 MG/DL (ref 1.6–2.4)
MCH RBC QN AUTO: 30.7 PG (ref 26–34)
MCHC RBC AUTO-ENTMCNC: 34.4 G/DL (ref 30–36.5)
MCV RBC AUTO: 89.1 FL (ref 80–99)
P-R INTERVAL, ECG05: 190 MS
PHOSPHATE SERPL-MCNC: 4.1 MG/DL (ref 2.6–4.7)
PLATELET # BLD AUTO: 252 K/UL (ref 150–400)
POTASSIUM SERPL-SCNC: 4.3 MMOL/L (ref 3.5–5.1)
PROTHROMBIN TIME: 33.2 SEC (ref 9–11.1)
Q-T INTERVAL, ECG07: 398 MS
QRS DURATION, ECG06: 76 MS
QTC CALCULATION (BEZET), ECG08: 438 MS
RBC # BLD AUTO: 4.5 M/UL (ref 3.8–5.2)
SODIUM SERPL-SCNC: 141 MMOL/L (ref 136–145)
URATE SERPL-MCNC: 3.6 MG/DL (ref 2.6–6)
VENTRICULAR RATE, ECG03: 73 BPM
WBC # BLD AUTO: 8.8 K/UL (ref 3.6–11)

## 2017-11-15 PROCEDURE — 84550 ASSAY OF BLOOD/URIC ACID: CPT | Performed by: NURSE PRACTITIONER

## 2017-11-15 PROCEDURE — 83735 ASSAY OF MAGNESIUM: CPT | Performed by: HOSPITALIST

## 2017-11-15 PROCEDURE — 93306 TTE W/DOPPLER COMPLETE: CPT

## 2017-11-15 PROCEDURE — 74011250637 HC RX REV CODE- 250/637: Performed by: SPECIALIST

## 2017-11-15 PROCEDURE — 85610 PROTHROMBIN TIME: CPT | Performed by: HOSPITALIST

## 2017-11-15 PROCEDURE — 36415 COLL VENOUS BLD VENIPUNCTURE: CPT | Performed by: HOSPITALIST

## 2017-11-15 PROCEDURE — 99218 HC RM OBSERVATION: CPT

## 2017-11-15 PROCEDURE — 96361 HYDRATE IV INFUSION ADD-ON: CPT

## 2017-11-15 PROCEDURE — 74011250637 HC RX REV CODE- 250/637: Performed by: HOSPITALIST

## 2017-11-15 PROCEDURE — 84100 ASSAY OF PHOSPHORUS: CPT | Performed by: HOSPITALIST

## 2017-11-15 PROCEDURE — 85027 COMPLETE CBC AUTOMATED: CPT | Performed by: HOSPITALIST

## 2017-11-15 PROCEDURE — 80048 BASIC METABOLIC PNL TOTAL CA: CPT | Performed by: HOSPITALIST

## 2017-11-15 RX ORDER — WARFARIN 2 MG/1
4 TABLET ORAL ONCE
Status: COMPLETED | OUTPATIENT
Start: 2017-11-15 | End: 2017-11-15

## 2017-11-15 RX ORDER — ACETAMINOPHEN 325 MG/1
650 TABLET ORAL
Status: DISCONTINUED | OUTPATIENT
Start: 2017-11-15 | End: 2017-11-17 | Stop reason: HOSPADM

## 2017-11-15 RX ORDER — HYDROCODONE BITARTRATE AND ACETAMINOPHEN 5; 325 MG/1; MG/1
1 TABLET ORAL
Status: DISCONTINUED | OUTPATIENT
Start: 2017-11-15 | End: 2017-11-17 | Stop reason: HOSPADM

## 2017-11-15 RX ORDER — ACETAMINOPHEN 325 MG/1
TABLET ORAL
Status: DISPENSED
Start: 2017-11-15 | End: 2017-11-15

## 2017-11-15 RX ORDER — IBUPROFEN 200 MG
1 TABLET ORAL DAILY
Status: DISCONTINUED | OUTPATIENT
Start: 2017-11-16 | End: 2017-11-15

## 2017-11-15 RX ORDER — IBUPROFEN 200 MG
1 TABLET ORAL EVERY 24 HOURS
Status: DISCONTINUED | OUTPATIENT
Start: 2017-11-15 | End: 2017-11-17 | Stop reason: HOSPADM

## 2017-11-15 RX ADMIN — HYDROCODONE BITARTRATE AND ACETAMINOPHEN 1 TABLET: 5; 325 TABLET ORAL at 16:14

## 2017-11-15 RX ADMIN — Medication 10 ML: at 22:16

## 2017-11-15 RX ADMIN — VENLAFAXINE HYDROCHLORIDE 75 MG: 37.5 CAPSULE, EXTENDED RELEASE ORAL at 22:16

## 2017-11-15 RX ADMIN — HYDROCODONE BITARTRATE AND ACETAMINOPHEN 1 TABLET: 5; 325 TABLET ORAL at 22:15

## 2017-11-15 RX ADMIN — CEPHALEXIN 500 MG: 500 CAPSULE ORAL at 18:25

## 2017-11-15 RX ADMIN — CEPHALEXIN 500 MG: 500 CAPSULE ORAL at 22:17

## 2017-11-15 RX ADMIN — PRAVASTATIN SODIUM 40 MG: 40 TABLET ORAL at 22:15

## 2017-11-15 RX ADMIN — WARFARIN SODIUM 4 MG: 2 TABLET ORAL at 16:13

## 2017-11-15 RX ADMIN — HYDROCODONE BITARTRATE AND ACETAMINOPHEN 1 TABLET: 5; 325 TABLET ORAL at 12:43

## 2017-11-15 RX ADMIN — CEPHALEXIN 500 MG: 500 CAPSULE ORAL at 12:35

## 2017-11-15 RX ADMIN — VENLAFAXINE HYDROCHLORIDE 75 MG: 37.5 CAPSULE, EXTENDED RELEASE ORAL at 00:23

## 2017-11-15 NOTE — PROGRESS NOTES
TRANSFER - IN REPORT:    Verbal report received from Deidra Hankins RN(name) on Saranya Bourne  being received from ED(unit) for routine progression of care      Report consisted of patients Situation, Background, Assessment and   Recommendations(SBAR). Information from the following report(s) SBAR was reviewed with the receiving nurse. Opportunity for questions and clarification was provided. Assessment completed upon patients arrival to unit and care assumed.

## 2017-11-15 NOTE — PROGRESS NOTES
Bedside shift change report given to Saud Gatica RN (oncoming nurse) by Heber Drake RN (offgoing nurse). Report included the following information SBAR.

## 2017-11-15 NOTE — PROGRESS NOTES
Cardiology Progress Note            Admit Date: 11/14/2017  Admit Diagnosis: Cellulitis  Date: 11/15/2017     Time: 9:13 AM    HPI:  Naty Rae is a 52 y.o. female w hx AVR in 8/2016 presenting with chest pain, fatigue, cough congestion and acute development of foot pain. Started on the right with the appearance of an area of erythema and warmth. Followed by similar appearance on the right foot today. No prior hs, no hx of gout. No fever or chills. No dyspnea. Continues to smoke. No bleeding issues. Cp atypical radiates through to the back, worse in certain positions        Assessment and Plan     1. Chest pain- atypical, worse with deep inspiration. trop, ekg negative  -No further testing needed    2. John. Dorsal feet erythema:  elevated CRP, Rt dorsal foot very ttp.  -On po antibiotics per primary team  -?possible gout-  Will defer to primary team    3. Hx of AVR: now with CRP elevation, BLE foot erythema. -TTE pending.  -Will review TTE before proceeding with any further imaging. Not sure prosthesis would be compatible with MRI anyway. 4. Tobacco abuse- recommend cessation. 5. Chronic anticoagulation due to AVR. INR 3.3. Pharmacy managing coumadin    Cardiology attending: seen and examined. Agree with assess and plan  Appearance of skin does not seem typical for emboli/endocarditis. No recent fevers/chills. Minimal wbc elevation. Wonder if could be gout with involvement of tendons rather than joints. Depending on echo results, may need SARAH BETH to further sort things out. Subjective:   Anemarie BA Bourne c/o RLE dorsal foot ttp, BLE dorsal foot erythema. States edema rt foot improved. Has had intermittent chest discomfort worse with deep inspiration- similar to pain she felt when she had AVR procedure.      Objective:      Physical Exam:                Visit Vitals    /62 (BP 1 Location: Left arm, BP Patient Position: At rest)    Pulse 65    Temp 98.2 °F (36.8 °C)    Resp 18    Ht 5' 3\" (1.6 m)    Wt 69.7 kg (153 lb 10.6 oz)    SpO2 99%    BMI 27.22 kg/m2          General Appearance:   Well developed, alert and oriented x 3, and   individual in no acute distress. Ears/Nose/Mouth/Throat:    Hearing grossly normal.         Neck:  Supple. Chest:    Lungs clear to auscultation bilaterally. Cardiovascular:    Regular rate and rhythm, S1, S2 normal, + mechanical click   Abdomen:    Non-distended. Extremities:  Bilateral dorsal foot erythema (L>R), ttp Rt dorsal foot. Skin:  Warm and dry. Telemetry: normal sinus rhythm          Data Review:    Labs:    Recent Results (from the past 24 hour(s))   PROTHROMBIN TIME + INR    Collection Time: 11/14/17  2:30 PM   Result Value Ref Range    INR 2.6 (H) 0.9 - 1.1      Prothrombin time 26.6 (H) 9.0 - 11.1 sec   CBC WITH AUTOMATED DIFF    Collection Time: 11/14/17  2:30 PM   Result Value Ref Range    WBC 11.5 (H) 3.6 - 11.0 K/uL    RBC 4.89 3.80 - 5.20 M/uL    HGB 15.0 11.5 - 16.0 g/dL    HCT 43.0 35.0 - 47.0 %    MCV 87.9 80.0 - 99.0 FL    MCH 30.7 26.0 - 34.0 PG    MCHC 34.9 30.0 - 36.5 g/dL    RDW 13.0 11.5 - 14.5 %    PLATELET 981 926 - 915 K/uL    NEUTROPHILS 61 32 - 75 %    LYMPHOCYTES 29 12 - 49 %    MONOCYTES 9 5 - 13 %    EOSINOPHILS 1 0 - 7 %    BASOPHILS 0 0 - 1 %    ABS. NEUTROPHILS 7.0 1.8 - 8.0 K/UL    ABS. LYMPHOCYTES 3.3 0.8 - 3.5 K/UL    ABS. MONOCYTES 1.0 0.0 - 1.0 K/UL    ABS. EOSINOPHILS 0.1 0.0 - 0.4 K/UL    ABS.  BASOPHILS 0.0 0.0 - 0.1 K/UL   METABOLIC PANEL, COMPREHENSIVE    Collection Time: 11/14/17  2:30 PM   Result Value Ref Range    Sodium 136 136 - 145 mmol/L    Potassium 4.0 3.5 - 5.1 mmol/L    Chloride 104 97 - 108 mmol/L    CO2 26 21 - 32 mmol/L    Anion gap 6 5 - 15 mmol/L    Glucose 76 65 - 100 mg/dL    BUN 9 6 - 20 MG/DL    Creatinine 0.69 0.55 - 1.02 MG/DL    BUN/Creatinine ratio 13 12 - 20      GFR est AA >60 >60 ml/min/1.73m2    GFR est non-AA >60 >60 ml/min/1.73m2    Calcium 9.1 8.5 - 10.1 MG/DL    Bilirubin, total 0.4 0.2 - 1.0 MG/DL    ALT (SGPT) 22 12 - 78 U/L    AST (SGOT) 21 15 - 37 U/L    Alk.  phosphatase 92 45 - 117 U/L    Protein, total 8.3 (H) 6.4 - 8.2 g/dL    Albumin 4.1 3.5 - 5.0 g/dL    Globulin 4.2 (H) 2.0 - 4.0 g/dL    A-G Ratio 1.0 (L) 1.1 - 2.2     C REACTIVE PROTEIN, QT    Collection Time: 11/14/17  2:30 PM   Result Value Ref Range    C-Reactive protein 1.32 (H) 0.00 - 0.60 mg/dL   SED RATE (ESR)    Collection Time: 11/14/17  2:30 PM   Result Value Ref Range    Sed rate, automated 9 0 - 20 mm/hr   TROPONIN I    Collection Time: 11/14/17  2:30 PM   Result Value Ref Range    Troponin-I, Qt. <0.04 <0.05 ng/mL   EKG, 12 LEAD, INITIAL    Collection Time: 11/14/17  2:32 PM   Result Value Ref Range    Ventricular Rate 73 BPM    Atrial Rate 73 BPM    P-R Interval 190 ms    QRS Duration 76 ms    Q-T Interval 398 ms    QTC Calculation (Bezet) 438 ms    Calculated P Axis 50 degrees    Calculated R Axis 61 degrees    Calculated T Axis 67 degrees    Diagnosis       Normal sinus rhythm  ST & T wave abnormality, consider anterior ischemia  When compared with ECG of 13-JAN-2017 20:17,  QT has shortened  Confirmed by Ramesh Whitehead M.D., Micheline Shaw ([id number]) on 11/15/2017 5:28:39 AM     LACTIC ACID    Collection Time: 11/14/17  2:43 PM   Result Value Ref Range    Lactic acid 0.7 0.4 - 2.0 MMOL/L   CULTURE, BLOOD    Collection Time: 11/14/17  2:45 PM   Result Value Ref Range    Special Requests: NO SPECIAL REQUESTS      Culture result: NO GROWTH AFTER 14 HOURS     CULTURE, BLOOD    Collection Time: 11/14/17  2:45 PM   Result Value Ref Range    Special Requests: NO SPECIAL REQUESTS      Culture result: NO GROWTH AFTER 14 HOURS     CULTURE, BLOOD    Collection Time: 11/14/17  4:48 PM   Result Value Ref Range    Special Requests: NO SPECIAL REQUESTS      Culture result: NO GROWTH AFTER 11 HOURS     CBC W/O DIFF    Collection Time: 11/15/17  3:35 AM   Result Value Ref Range    WBC 8.8 3.6 - 11.0 K/uL    RBC 4.50 3.80 - 5.20 M/uL    HGB 13.8 11.5 - 16.0 g/dL    HCT 40.1 35.0 - 47.0 %    MCV 89.1 80.0 - 99.0 FL    MCH 30.7 26.0 - 34.0 PG    MCHC 34.4 30.0 - 36.5 g/dL    RDW 13.0 11.5 - 14.5 %    PLATELET 377 317 - 341 K/uL   METABOLIC PANEL, BASIC    Collection Time: 11/15/17  3:35 AM   Result Value Ref Range    Sodium 141 136 - 145 mmol/L    Potassium 4.3 3.5 - 5.1 mmol/L    Chloride 110 (H) 97 - 108 mmol/L    CO2 22 21 - 32 mmol/L    Anion gap 9 5 - 15 mmol/L    Glucose 80 65 - 100 mg/dL    BUN 12 6 - 20 MG/DL    Creatinine 0.61 0.55 - 1.02 MG/DL    BUN/Creatinine ratio 20 12 - 20      GFR est AA >60 >60 ml/min/1.73m2    GFR est non-AA >60 >60 ml/min/1.73m2    Calcium 8.5 8.5 - 10.1 MG/DL   MAGNESIUM    Collection Time: 11/15/17  3:35 AM   Result Value Ref Range    Magnesium 2.0 1.6 - 2.4 mg/dL   PHOSPHORUS    Collection Time: 11/15/17  3:35 AM   Result Value Ref Range    Phosphorus 4.1 2.6 - 4.7 MG/DL   PROTHROMBIN TIME + INR    Collection Time: 11/15/17  3:35 AM   Result Value Ref Range    INR 3.3 (H) 0.9 - 1.1      Prothrombin time 33.2 (H) 9.0 - 11.1 sec          Radiology:        Current Facility-Administered Medications   Medication Dose Route Frequency    acetaminophen (TYLENOL) tablet 650 mg  650 mg Oral Q6H PRN    acetaminophen (TYLENOL) 325 mg tablet        pravastatin (PRAVACHOL) tablet 40 mg  40 mg Oral QHS    venlafaxine-SR (EFFEXOR-XR) capsule 75 mg  75 mg Oral DAILY    0.9% sodium chloride infusion  75 mL/hr IntraVENous CONTINUOUS    sodium chloride (NS) flush 5-10 mL  5-10 mL IntraVENous Q8H    sodium chloride (NS) flush 5-10 mL  5-10 mL IntraVENous PRN    cephALEXin (KEFLEX) capsule 500 mg  500 mg Oral Q6H    Warfarin pharmacy to dose   Other Rx Dosing/Monitoring          Analisa Haji.  JANETT Seymour     [facility] [address] [place] [address], [address] [place] [address],[address]   [place] [address] [address] [facility]   [phone]

## 2017-11-15 NOTE — PROGRESS NOTES
Pharmacist Note  Warfarin Dosing  Consult provided for this 52 y. o.female to manage warfarin for Mechanical Heart Valve    INR Goal: 2.5- 3.5 (mechanical heart valve)    Home regimen/ tablet size: 5 mg on Wednesday and Saturday  4 mg on Sunday, Monday, [place] Mayhew and Friday. Last dose 4 mg on 11/13    Drugs that may increase INR: None  Drugs that may decrease INR: None  Other current anticoagulants/ drugs that may increase bleeding risk: None  Risk factors: None  Daily INR ordered: YES    Recent Labs      11/15/17   0335  11/14/17   1430   HGB  13.8  15.0   INR  3.3*  2.6*     Date               INR                  Dose  11/14  2.6  5 mg  11/15  3.3  4 mg                                                                               Assessment/ Plan: Will order warfarin 4 mg PO x 1 dose. Due to rapid elevation in INR and because patient received 5 mg yesterday when would have received 4 mg. Pharmacy will continue to monitor daily and adjust therapy as indicated. Please contact the pharmacist at   for outpatient recommendations if needed.

## 2017-11-15 NOTE — PROGRESS NOTES
Bedside shift change report given to [facility] JEREMÍAS Khalil (oncoming nurse) by Ekta Bill RN (offgoing nurse). Report included the following information SBAR, Kardex, ED Summary, Recent Results and Cardiac Rhythm NSR. Problem: Cellulitis Care Plan (Adult)  Goal: *Control of acute pain  Outcome: Progressing Towards Goal  Pain medication as needed  Goal: *Skin integrity maintained  Outcome: Progressing Towards Goal  Skin of reza. Feet remains intact  Goal: *Absence of infection signs and symptoms  Outcome: Progressing Towards Goal  Will have a decrease in redness of reza feet    0045:  Paged hospitalist for patient as patient requesting pain medication. See MAR    0200:  Patient refused Tylenol that was ordered for pain. States that it will not help, that she needs something stronger.

## 2017-11-15 NOTE — ED NOTES
Patient left department  for transportation to inpatient bed. Patient's VS at the time of transfer were /78, 99% on RA, denies pain at this time, 98.5 orally, HR 65 sinus rhythm on the monitor. Patient was alert and oriented x 4 and denies further needs at time of transfer. Patient's family went home prior to transfer to floor. TRANSFER - OUT REPORT:    Verbal report given to Franklin Waite RN(name) on Saranya Bourne  being transferred to 448(unit) for routine progression of care       Report consisted of patients Situation, Background, Assessment and   Recommendations(SBAR). Information from the following report(s) SBAR, Kardex, ED Summary, [facility] and Recent Results was reviewed with the receiving nurse. Opportunity for questions and clarification was provided.
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
## 2017-11-15 NOTE — CARDIO/PULMONARY
Cardiac Rehab: Per EMR, patient is a current smoker.  Smoking Cessation Program information placed on the AVS.    Chacha Palmer RN

## 2017-11-15 NOTE — PROGRESS NOTES
Care Management Interventions  PCP Verified by CM: Yes  Last Visit to PCP: 08/25/17  Mode of Transport at Discharge: Other (see comment) (private vehicle)  Discharge Durable Medical Equipment: No  Physical Therapy Consult: No  Occupational Therapy Consult: No  Speech Therapy Consult: No  Current Support Network:  (Independent with ADLs)  Confirm Follow Up Transport: Self  Plan discussed with Pt/Family/Caregiver: Yes  Freedom of Choice Offered: Yes  Discharge Location  Discharge Placement: Home    Cm reviewed chart. Pt is independent with her ADLs and IADLs. Pt lives in a private residence and has supportive friends and family. Pt is employed. Pt's PCP is Dr. Katie Hutchins with [facility]. Pt gets her prescriptions filled at her local [facility]. Plan is to return home at time of discharge. Family/Friends will transport.   SINCERE Gutierrez, ACM

## 2017-11-15 NOTE — PROGRESS NOTES
Hospitalist Progress Note  Mago Rizvi NP  Answering service: [phone] OR [phone] from in house phone  Cell: [phone]      Date of Service:  11/15/2017  NAME:  Oumar Busby  :  1970  MRN:  [id number]      Admission Summary:    52 y.o lady with a history of bicuspid aortic valve s/p mechanical AVR on coumadin, s/p CABG, who presents with right foot pain. The pain is on the dorsum of the right foot, began 3 days ago, burning in nature, without known exacerbating or alleviating factors, associated with overlying warmth and erythema. She denies associated fever, chills, history of penetrating trauma. She is starting to feel similar symptoms in her left foot as well. She feels well otherwise.        Interval history / Subjective:     Ms. Kimmie Barraza states her feet hurt her and requests pain medication. Dr. Diana Rivas rounded with me and will perform SARAH BETH in AM     Assessment & Plan:     R Foot Cellulitis versus Gout   No leukocytosis, tachycardia or fever.   -Keflex  -NGTD blood cultures  -she was given a dose of colchicine in the ED for possible gout  -check uric acid in AM, may benefit from steroids     History of AVR:  -continue coumadin, consult pharmacy for dosing  Given her prosthetic heart valve there was concern for endocarditis. -I discussed this with Dr. Diana Rivas, will perform SARAH BETH in AM     Chest pain: seems non-cardiac in nature. She has a hx of CABG but this was not due to CAD (had coronary occlusion during AVR surgery?).  Cardiology following     Hyperlipidemia:   Statin      Tobacco Abuse  Counseled    DVT ppx: on coumadin    Care Plan discussed with: Patient/Family, Nurse,  and Consultant Dr. Diana Rivas  Disposition: Home w/Family and TBD     Hospital Problems  Date Reviewed: 2017          Codes Class Noted POA    * (Principal)Cellulitis ICD-10-CM: L03.90  ICD-9-CM: 682.9  2017 Unknown                Review of Systems:   A comprehensive review of systems was negative except for that written in the HPI. Vital Signs:    Last 24hrs VS reviewed since prior progress note. Most recent are:  Visit Vitals    /78 (BP 1 Location: Right arm, BP Patient Position: At rest)    Pulse 68    Temp 98.4 °F (36.9 °C)    Resp 18    Ht 5' 3\" (1.6 m)    Wt 69.7 kg (153 lb 10.6 oz)    SpO2 99%    BMI 27.22 kg/m2         Intake/Output Summary (Last 24 hours) at 11/15/17 1638  Last data filed at 11/15/17 1345   Gross per 24 hour   Intake            152.5 ml   Output              500 ml   Net           -347.5 ml        Physical Examination:             Constitutional:  No acute distress, cooperative, pleasant    ENT:  Oral mucous moist, oropharynx benign. Neck supple,    Resp:  CTA bilaterally. No wheezing/rhonchi/rales. No accessory muscle use   CV:  Regular rhythm, normal rate,+ click, no murmurs, gallops, rubs    GI:  Soft, non distended, non tender. normoactive bowel sounds, no hepatosplenomegaly     Musculoskeletal:  No edema, warm, 2+ pulses throughout    Neurologic:  Moves all extremities. AAOx3, EOMS intact     Psych:  Reasonable insight, Not anxious nor agitated.   Skin:  Good turgor, no rashes or ulcers, R foot redness  Hematologic/Lymphatic/Immunlogic:  No jaundice nor lymph node swelling       Data Review:    Review and/or order of clinical lab test  Review and/or order of tests in the radiology section of CPT      Labs:     Recent Labs      11/15/17   0335  11/14/17   1430   WBC  8.8  11.5*   HGB  13.8  15.0   HCT  40.1  43.0   PLT  252  268     Recent Labs      11/15/17   0335 11/14/17   1430   NA  141  136   K  4.3  4.0   CL  110*  104   CO2  22  26   BUN  12  9   CREA  0.61  0.69   GLU  80  76   CA  8.5  9.1   MG  2.0   --    PHOS  4.1   --      Recent Labs      11/14/17   1430   SGOT  21   ALT  22   AP  92   TBILI  0.4   TP  8.3*   ALB  4.1   GLOB  4.2*     Recent Labs      11/15/17   0335 11/14/17 1430 INR  3.3*  2.6*   PTP  33.2*  26.6*      No results for input(s): FE, TIBC, PSAT, FERR in the last 72 hours. Lab Results   Component Value Date/Time    Folate 13.4 04/16/2010 09:48 AM      No results for input(s): PH, PCO2, PO2 in the last 72 hours.   Recent Labs      11/14/17   1430   TROIQ  <0.04     Lab Results   Component Value Date/Time    Cholesterol, total 209 08/22/2017 09:01 AM    HDL Cholesterol 47 08/22/2017 09:01 AM    LDL, calculated 143 08/22/2017 09:01 AM    Triglyceride 95 08/22/2017 09:01 AM     Lab Results   Component Value Date/Time    Glucose (POC) 95 08/20/2016 06:32 AM    Glucose (POC) 113 08/19/2016 05:33 PM    Glucose (POC) 122 08/19/2016 01:09 PM    Glucose (POC) 115 08/19/2016 12:06 PM    Glucose (POC) 114 08/19/2016 11:02 AM     Lab Results   Component Value Date/Time    Color YELLOW/STRAW 01/13/2017 10:45 PM    Appearance CLEAR 01/13/2017 10:45 PM    Specific gravity 1.016 01/13/2017 10:45 PM    pH (UA) 5.5 01/13/2017 10:45 PM    Protein NEGATIVE  01/13/2017 10:45 PM    Glucose NEGATIVE  01/13/2017 10:45 PM    Ketone NEGATIVE  01/13/2017 10:45 PM    Bilirubin NEGATIVE  01/13/2017 10:45 PM    Urobilinogen 1.0 01/13/2017 10:45 PM    Nitrites NEGATIVE  01/13/2017 10:45 PM    Leukocyte Esterase NEGATIVE  01/13/2017 10:45 PM    Epithelial cells FEW 01/13/2017 10:45 PM    Bacteria NEGATIVE  01/13/2017 10:45 PM    WBC 0-4 01/13/2017 10:45 PM    RBC 0-5 01/13/2017 10:45 PM         Medications Reviewed:     Current Facility-Administered Medications   Medication Dose Route Frequency    acetaminophen (TYLENOL) tablet 650 mg  650 mg Oral Q6H PRN    HYDROcodone-acetaminophen (NORCO) 5-325 mg per tablet 1 Tab  1 Tab Oral Q4H PRN    nicotine (NICODERM CQ) 14 mg/24 hr patch 1 Patch  1 Patch TransDERmal Q24H    pravastatin (PRAVACHOL) tablet 40 mg  40 mg Oral QHS    venlafaxine-SR (EFFEXOR-XR) capsule 75 mg  75 mg Oral DAILY    0.9% sodium chloride infusion  75 mL/hr IntraVENous CONTINUOUS    sodium chloride (NS) flush 5-10 mL  5-10 mL IntraVENous Q8H    sodium chloride (NS) flush 5-10 mL  5-10 mL IntraVENous PRN    cephALEXin (KEFLEX) capsule 500 mg  500 mg Oral Q6H    Warfarin pharmacy to dose   Other Rx Dosing/Monitoring     ______________________________________________________________________  EXPECTED LENGTH OF STAY: - - -  ACTUAL LENGTH OF STAY:          0                 Tejinder Villalobos NP

## 2017-11-16 ENCOUNTER — APPOINTMENT (OUTPATIENT)
Dept: CARDIAC CATH/INVASIVE PROCEDURES | Age: 47
End: 2017-11-16
Payer: COMMERCIAL

## 2017-11-16 LAB
INR PPP: 3.1 (ref 0.9–1.1)
PROTHROMBIN TIME: 31.1 SEC (ref 9–11.1)

## 2017-11-16 PROCEDURE — 74011250637 HC RX REV CODE- 250/637: Performed by: FAMILY MEDICINE

## 2017-11-16 PROCEDURE — 85610 PROTHROMBIN TIME: CPT | Performed by: HOSPITALIST

## 2017-11-16 PROCEDURE — 74011250636 HC RX REV CODE- 250/636: Performed by: SPECIALIST

## 2017-11-16 PROCEDURE — 96361 HYDRATE IV INFUSION ADD-ON: CPT

## 2017-11-16 PROCEDURE — 99218 HC RM OBSERVATION: CPT

## 2017-11-16 PROCEDURE — 99152 MOD SED SAME PHYS/QHP 5/>YRS: CPT

## 2017-11-16 PROCEDURE — 36415 COLL VENOUS BLD VENIPUNCTURE: CPT | Performed by: HOSPITALIST

## 2017-11-16 PROCEDURE — 74011250637 HC RX REV CODE- 250/637: Performed by: HOSPITALIST

## 2017-11-16 PROCEDURE — 74011250637 HC RX REV CODE- 250/637: Performed by: SPECIALIST

## 2017-11-16 RX ORDER — WARFARIN 2 MG/1
4 TABLET ORAL ONCE
Status: COMPLETED | OUTPATIENT
Start: 2017-11-16 | End: 2017-11-16

## 2017-11-16 RX ORDER — MIDAZOLAM HYDROCHLORIDE 1 MG/ML
.5-1 INJECTION, SOLUTION INTRAMUSCULAR; INTRAVENOUS
Status: DISCONTINUED | OUTPATIENT
Start: 2017-11-16 | End: 2017-11-16

## 2017-11-16 RX ORDER — FENTANYL CITRATE 50 UG/ML
25-200 INJECTION, SOLUTION INTRAMUSCULAR; INTRAVENOUS
Status: DISCONTINUED | OUTPATIENT
Start: 2017-11-16 | End: 2017-11-16

## 2017-11-16 RX ORDER — SODIUM CHLORIDE 0.9 % (FLUSH) 0.9 %
10 SYRINGE (ML) INJECTION AS NEEDED
Status: DISCONTINUED | OUTPATIENT
Start: 2017-11-16 | End: 2017-11-16

## 2017-11-16 RX ORDER — ATROPINE SULFATE 0.1 MG/ML
1 INJECTION INTRAVENOUS AS NEEDED
Status: DISCONTINUED | OUTPATIENT
Start: 2017-11-16 | End: 2017-11-16

## 2017-11-16 RX ADMIN — WARFARIN SODIUM 4 MG: 2 TABLET ORAL at 18:36

## 2017-11-16 RX ADMIN — CEPHALEXIN 500 MG: 500 CAPSULE ORAL at 23:41

## 2017-11-16 RX ADMIN — HYDROCODONE BITARTRATE AND ACETAMINOPHEN 1 TABLET: 5; 325 TABLET ORAL at 08:51

## 2017-11-16 RX ADMIN — HYDROCODONE BITARTRATE AND ACETAMINOPHEN 1 TABLET: 5; 325 TABLET ORAL at 19:18

## 2017-11-16 RX ADMIN — CEPHALEXIN 500 MG: 500 CAPSULE ORAL at 06:42

## 2017-11-16 RX ADMIN — Medication 10 ML: at 06:42

## 2017-11-16 RX ADMIN — MIDAZOLAM HYDROCHLORIDE 1 MG: 1 INJECTION, SOLUTION INTRAMUSCULAR; INTRAVENOUS at 15:07

## 2017-11-16 RX ADMIN — CEPHALEXIN 500 MG: 500 CAPSULE ORAL at 18:36

## 2017-11-16 RX ADMIN — FENTANYL CITRATE 50 MCG: 50 INJECTION, SOLUTION INTRAMUSCULAR; INTRAVENOUS at 15:04

## 2017-11-16 RX ADMIN — MIDAZOLAM HYDROCHLORIDE 2 MG: 1 INJECTION, SOLUTION INTRAMUSCULAR; INTRAVENOUS at 15:02

## 2017-11-16 RX ADMIN — PRAVASTATIN SODIUM 40 MG: 40 TABLET ORAL at 21:22

## 2017-11-16 RX ADMIN — CEPHALEXIN 500 MG: 500 CAPSULE ORAL at 12:13

## 2017-11-16 RX ADMIN — Medication 10 ML: at 21:22

## 2017-11-16 RX ADMIN — MIDAZOLAM HYDROCHLORIDE 2 MG: 1 INJECTION, SOLUTION INTRAMUSCULAR; INTRAVENOUS at 15:04

## 2017-11-16 RX ADMIN — FENTANYL CITRATE 50 MCG: 50 INJECTION, SOLUTION INTRAMUSCULAR; INTRAVENOUS at 15:02

## 2017-11-16 RX ADMIN — VENLAFAXINE HYDROCHLORIDE 75 MG: 37.5 CAPSULE, EXTENDED RELEASE ORAL at 21:22

## 2017-11-16 RX ADMIN — Medication 10 ML: at 17:21

## 2017-11-16 NOTE — PROGRESS NOTES
Cardiac Cath Lab Procedure Area Arrival Note:    Dal Halsted arrived to Cardiac Cath Lab, Procedure Area. Patient identifiers verified with NAME and DATE OF BIRTH. Procedure verified with patient. Consent forms verified. Allergies verified. Patient informed of procedure and plan of care. Questions answered with review. Patient voiced understanding of procedure and plan of care. Patient on cardiac monitor, non-invasive blood pressure, SPO2 monitor. On room air then placed on O2 @ 2 lpm via NC.  IV of normal saline on pump at 25 ml/hr. Patient status doing well without problems. Patient is A&Ox 4. Patient reports no pain. Patient medicated during procedure with orders obtained and verified by Dr. Linnette Higgins. Refer to patients Cardiac Cath Lab PROCEDURE REPORT for vital signs, assessment, status, and response during procedure, printed at end of case. Printed report on chart or scanned into chart.

## 2017-11-16 NOTE — PROCEDURES
Cardiac Catheterization Procedure Note   Patient: Cyndee Mcdaniel  MRN: [id number]  SSN: [id number]   YOB: 1970 Age: 52 y.o. Sex: female    Date of Procedure: 11/16/2017   Pre-procedure Diagnosis: Infection and Valvular Heart Disease  Post-procedure Diagnosis: AI  Procedure: SARAH BETH  :  Dr. Devorah Samaniego MD    Assistant(s):  None  Anesthesia: Moderate Sedation   Estimated Blood Loss: Less than 10 mL   Specimens Removed: None  Findings: Moderate AI due to perivalvular leak next to mitral valve. Non-mobile echogenic focus noted at site of leak, cannot say for sure it represents a vegetation. AV hard to see due to acoustic shadowing.   Complications: None   Implants:  None  Signed by:  Devorah Samaniego MD  11/16/2017  3:13 PM

## 2017-11-16 NOTE — PROGRESS NOTES
Transfer to [address] [place] [address] from Procedure Area    Verbal report given to Ela Shankar on Euna Cables being transferred to Cardiac Cath Lab  for routine progression of care   Patient is post SARAH BETH procedure. Patient stable upon transfer to . Report consisted of patients Situation, Background, Assessment and   Recommendations(SBAR). Information from the following report(s) Kardex, Procedure Summary, Intake/Output, MAR and Recent Results was reviewed with the receiving nurse. Opportunity for questions and clarification was provided. Patient medicated during procedure with orders obtained and verified by Dr. Ray Barboza. Refer to patient PROCEDURE REPORT for vital signs, assessment, status, and response during procedure.

## 2017-11-16 NOTE — PROGRESS NOTES
Pharmacist Note  Warfarin Dosing  Consult provided for this 52 y. o.female to manage warfarin for Mechanical Heart Valve    INR Goal: 2.5- 3.5 (mechanical heart valve)    Home regimen/ tablet size: 5 mg on Wednesday and Saturday  4 mg on Sunday, Monday, Laurinda Shipper and Friday. Last dose 4 mg on 11/13    Drugs that may increase INR: None  Drugs that may decrease INR: None  Other current anticoagulants/ drugs that may increase bleeding risk: None  Risk factors: None  Daily INR ordered: YES    Recent Labs      11/16/17   0439  11/15/17   0335  11/14/17   1430   HGB   --   13.8  15.0   INR  3.1*  3.3*  2.6*     Date               INR                  Dose  11/14  2.6  5 mg  11/15  3.3  4 mg  11/16               3.1                  4 mg                                                                               Assessment/ Plan:  INR within target range. Will order warfarin 4 mg PO x 1 dose [coincides with 4 mg usually taken on Thursday at home]. Pharmacy will continue to monitor daily and adjust therapy as indicated. Please contact the pharmacist at   for outpatient recommendations if needed.

## 2017-11-16 NOTE — PROGRESS NOTES
Cardiology Progress Note            Admit Date: 11/14/2017  Admit Diagnosis: Cellulitis  Date: 11/16/2017     Time: 9:13 AM     Assessment and Plan     1. Chest pain- atypical, worse with deep inspiration. trop, ekg negative  -TTE NL EF, NWMA  -No further testing needed    2. John. Dorsal feet erythema:  Significantly improved  -On po antibiotics per primary team  -Uric acid level NL  -BC NGTD    3. Hx of AVR: now with CRP elevation, BLE foot erythema,  -TTE:mild-mod AI, possible sm. Vegetation aortic valve. Plan for SARAH BETH today to evaluate for perivalvular leak and vegetation although    BC NGTD    4. Tobacco abuse- recommend cessation. 5. Chronic anticoagulation due to AVR. INR 3.1. Pharmacy to manage coumadin     Pt with significant improvement in BLE erythema, nl uric acid level. TTE with mild-mod AI, possible sm. Vegetation - will proceed with SARAH BETH today at 2:30 PM to evaluate for perivalvular leak and vegetation although BC thus far have NGTD. Cardiology attending: seen and examined. Agree with assess and plan  Doubt endocarditis at this point, SARAH BETH given abnormalities on tte. Feet much improved    Subjective:   Saranya Bourne with significant improvement in BLE dorsal foot edema. Able to stand without pain in feet. Still with some chest discomfort. Objective:      Physical Exam:                Visit Vitals    /65 (BP 1 Location: Left arm, BP Patient Position: At rest)    Pulse (!) 55    Temp 98 °F (36.7 °C)    Resp 18    Ht 5' 3\" (1.6 m)    Wt 69.7 kg (153 lb 10.6 oz)    SpO2 99%    BMI 27.22 kg/m2          General Appearance:   Well developed, alert and oriented x 3, and   individual in no acute distress. Ears/Nose/Mouth/Throat:    Hearing grossly normal.         Neck:  Supple. Chest:    Lungs clear to auscultation bilaterally.    Cardiovascular:    Regular rate and rhythm, S1, S2 normal, + mechanical click, grade II/VI systolic murmur heard best at RUSB   Abdomen:    Non-distended. Extremities:  Rt foot erythema now mild. Left foot erythmema close to resolved. Skin:  Warm and dry.      Telemetry:           Data Review:    Labs:    Recent Results (from the past 24 hour(s))   URIC ACID    Collection Time: 11/15/17  7:42 PM   Result Value Ref Range    Uric acid 3.6 2.6 - 6.0 MG/DL   PROTHROMBIN TIME + INR    Collection Time: 11/16/17  4:39 AM   Result Value Ref Range    INR 3.1 (H) 0.9 - 1.1      Prothrombin time 31.1 (H) 9.0 - 11.1 sec          Radiology:        Current Facility-Administered Medications   Medication Dose Route Frequency    acetaminophen (TYLENOL) tablet 650 mg  650 mg Oral Q6H PRN    HYDROcodone-acetaminophen (NORCO) 5-325 mg per tablet 1 Tab  1 Tab Oral Q4H PRN    nicotine (NICODERM CQ) 14 mg/24 hr patch 1 Patch  1 Patch TransDERmal Q24H    pravastatin (PRAVACHOL) tablet 40 mg  40 mg Oral QHS    venlafaxine-SR (EFFEXOR-XR) capsule 75 mg  75 mg Oral DAILY    0.9% sodium chloride infusion  75 mL/hr IntraVENous CONTINUOUS    sodium chloride (NS) flush 5-10 mL  5-10 mL IntraVENous Q8H    sodium chloride (NS) flush 5-10 mL  5-10 mL IntraVENous PRN    cephALEXin (KEFLEX) capsule 500 mg  500 mg Oral Q6H    Warfarin pharmacy to dose   Other Rx Dosing/Monitoring     Nabor Cohen MD          [facility] [address] [facility] [address] [facility] [address]   [facility], [address]   [phone]

## 2017-11-16 NOTE — PROGRESS NOTES
Verbal report given to Oksana(name) on NAME  being transferred to (unit) for routine progression of care       Report consisted of patients Situation, Background, Assessment and   Recommendations(SBAR). Information from the following report(s) Procedure Summary, MAR and Recent Results was reviewed with the receiving nurse. Lines:       Opportunity for questions and clarification was provided.       Patient transported with:   [employer]

## 2017-11-16 NOTE — PROGRESS NOTES
Hospitalist Progress Note  Severo Griffin, MD  Answering service: [phone] from in house phone      Date of Service:  2017  NAME:  Diana Olvera  :  1970  MRN:  [id number]      Admission Summary:     52 y.o lady with a history of bicuspid aortic valve s/p mechanical AVR on coumadin, s/p CABG, who presents with right foot pain. The pain is on the dorsum of the right foot, began 3 days ago, burning in nature, without known exacerbating or alleviating factors, associated with overlying warmth and erythema. She denies associated fever, chills, history of penetrating trauma. She is starting to feel similar symptoms in her left foot as well. She feels well otherwise. Interval history / Subjective:       Patient doing well this AM. Her foot pain and swelling is much improved. Assessment & Plan:     1. Cellulitis  Patient's erythema on the right foot is much improved. Unlikely gout. Blood cultures negative, uric acid normal, continuing antibiotic. 2. Prosthetic aortic valve  TTE showing mild to moderate AI, with questionable small vegetation on AV. Following with SARAH BETH today. Currently on coumadin for anticoagulation, pharmacy adjusting dose. INR 3.1 today. 3. Chest pain  Atypical, no clinical signs and symptoms of acute coronary syndrome. 4. Dyslipidemia  On statin. Check lipid profile in AM.    5. Tobacco abuse  Counseling. Code status: Full  DVT prophylaxis: On coumadin    Care Plan discussed with: Patient/Family  Disposition: Home w/Family     Hospital Problems  Date Reviewed: 2017          Codes Class Noted POA    * (Principal)Cellulitis ICD-10-CM: L03.90  ICD-9-CM: 682.9  2017 Unknown                Review of Systems:   A comprehensive review of systems was negative except for that written in the HPI. Vital Signs:    Last 24hrs VS reviewed since prior progress note.  Most recent are:  Visit Vitals  /58 (BP 1 Location: Left arm, BP Patient Position: At rest)    Pulse (!) 50    Temp 97.6 °F (36.4 °C)    Resp 18    Ht 5' 3\" (1.6 m)    Wt 69.7 kg (153 lb 10.6 oz)    SpO2 99%    BMI 27.22 kg/m2         Intake/Output Summary (Last 24 hours) at 11/16/17 1456  Last data filed at 11/15/17 1840   Gross per 24 hour   Intake              240 ml   Output                0 ml   Net              240 ml        Physical Examination:             Constitutional:  No acute distress, cooperative, pleasant    ENT:  Oral mucous moist, oropharynx benign. Neck supple,    Resp:  CTA bilaterally. No wheezing/rhonchi/rales. No accessory muscle use   CV:  Regular rhythm, normal rate, no murmurs, gallops, rubs    GI:  Soft, non distended, non tender. normoactive bowel sounds, no hepatosplenomegaly     Musculoskeletal:  No edema, warm, 2+ pulses throughout    Neurologic:  Moves all extremities. AAOx3, CN II-XII reviewed     Skin:  Good turgor, no rashes or ulcers       Data Review:    Review and/or order of clinical lab test      Labs:     Recent Labs      11/15/17   0335 11/14/17   1430   WBC  8.8  11.5*   HGB  13.8  15.0   HCT  40.1  43.0   PLT  252  268     Recent Labs      11/15/17   1942  11/15/17   0335 11/14/17   1430   NA   --   141  136   K   --   4.3  4.0   CL   --   110*  104   CO2   --   22  26   BUN   --   12  9   CREA   --   0.61  0.69   GLU   --   80  76   CA   --   8.5  9.1   MG   --   2.0   --    PHOS   --   4.1   --    URICA  3.6   --    --      Recent Labs      11/14/17   1430   SGOT  21   ALT  22   AP  92   TBILI  0.4   TP  8.3*   ALB  4.1   GLOB  4.2*     Recent Labs      11/16/17   0439  11/15/17   0335  11/14/17   1430   INR  3.1*  3.3*  2.6*   PTP  31.1*  33.2*  26.6*      No results for input(s): FE, TIBC, PSAT, FERR in the last 72 hours. Lab Results   Component Value Date/Time    Folate 13.4 04/16/2010 09:48 AM      No results for input(s): PH, PCO2, PO2 in the last 72 hours.   Recent Labs 11/14/17   1430   TROIQ  <0.04     Lab Results   Component Value Date/Time    Cholesterol, total 209 08/22/2017 09:01 AM    HDL Cholesterol 47 08/22/2017 09:01 AM    LDL, calculated 143 08/22/2017 09:01 AM    Triglyceride 95 08/22/2017 09:01 AM     Lab Results   Component Value Date/Time    Glucose (POC) 95 08/20/2016 06:32 AM    Glucose (POC) 113 08/19/2016 05:33 PM    Glucose (POC) 122 08/19/2016 01:09 PM    Glucose (POC) 115 08/19/2016 12:06 PM    Glucose (POC) 114 08/19/2016 11:02 AM     Lab Results   Component Value Date/Time    Color YELLOW/STRAW 01/13/2017 10:45 PM    Appearance CLEAR 01/13/2017 10:45 PM    Specific gravity 1.016 01/13/2017 10:45 PM    pH (UA) 5.5 01/13/2017 10:45 PM    Protein NEGATIVE  01/13/2017 10:45 PM    Glucose NEGATIVE  01/13/2017 10:45 PM    Ketone NEGATIVE  01/13/2017 10:45 PM    Bilirubin NEGATIVE  01/13/2017 10:45 PM    Urobilinogen 1.0 01/13/2017 10:45 PM    Nitrites NEGATIVE  01/13/2017 10:45 PM    Leukocyte Esterase NEGATIVE  01/13/2017 10:45 PM    Epithelial cells FEW 01/13/2017 10:45 PM    Bacteria NEGATIVE  01/13/2017 10:45 PM    WBC 0-4 01/13/2017 10:45 PM    RBC 0-5 01/13/2017 10:45 PM         Medications Reviewed:     Current Facility-Administered Medications   Medication Dose Route Frequency    warfarin (COUMADIN) tablet 4 mg  4 mg Oral ONCE    atropine injection 1 mg  1 mg IntraVENous PRN    fentaNYL citrate (PF) injection  mcg   mcg IntraVENous Multiple    midazolam (VERSED) injection 0.5-10 mg  0.5-10 mg IntraVENous Multiple    saline peripheral flush soln 10 mL  10 mL InterCATHeter PRN    acetaminophen (TYLENOL) tablet 650 mg  650 mg Oral Q6H PRN    HYDROcodone-acetaminophen (NORCO) 5-325 mg per tablet 1 Tab  1 Tab Oral Q4H PRN    nicotine (NICODERM CQ) 14 mg/24 hr patch 1 Patch  1 Patch TransDERmal Q24H    pravastatin (PRAVACHOL) tablet 40 mg  40 mg Oral QHS    venlafaxine-SR (EFFEXOR-XR) capsule 75 mg  75 mg Oral DAILY    0.9% sodium chloride infusion  75 mL/hr IntraVENous CONTINUOUS    sodium chloride (NS) flush 5-10 mL  5-10 mL IntraVENous Q8H    sodium chloride (NS) flush 5-10 mL  5-10 mL IntraVENous PRN    cephALEXin (KEFLEX) capsule 500 mg  500 mg Oral Q6H    Warfarin pharmacy to dose   Other Rx Dosing/Monitoring     ______________________________________________________________________  EXPECTED LENGTH OF STAY: - - -  ACTUAL LENGTH OF STAY:          0                 Gina Aguilar MD

## 2017-11-16 NOTE — PROGRESS NOTES
TRANSFER - IN REPORT:    Verbal report received from Paulino garza on Saranya Bourne  being received from procedural area for routine progression of care. Report consisted of patients Situation, Background, Assessment and Recommendations(SBAR). Information from the following report(s) Procedure Summary, MAR and Recent Results was reviewed with the receiving clinician. Opportunity for questions and clarification was provided. Assessment completed upon patients arrival to [address] [place] [address] and care assumed. Cardiac Cath Lab Recovery Arrival Note:    Saranya Bourne arrived to [facility] recovery area. Patient procedure= SARAH BETH. Patient on cardiac monitor, non-invasive blood pressure, SPO2 monitor. On  or O2 @ 2 lpm via NC.  IV  of NS on pump at 25 ml/hr. Patient status doing well without problems. Patient is A&Ox 3. Patient reports no c/o's.

## 2017-11-16 NOTE — PROGRESS NOTES
Cardiac Cath Lab Recovery Arrival Note:      Quentin Mcdonnell arrived to Cardiac Cath Lab, Recovery Area. Staff introduced to patient. Patient identifiers verified with NAME and DATE OF BIRTH. Procedure verified with patient. Consent forms reviewed and signed by patient or authorized representative and verified. Allergies verified. Patient and family oriented to department. Patient and family informed of procedure and plan of care. Questions answered with review. Patient prepped for procedure, per orders from physician, prior to arrival.    Patient on cardiac monitor, non-invasive blood pressure, SPO2 monitor. On RA. Patient is A&Ox 4. Patient reports no c/o's. Patient in stretcher, in low position, with side rails up, call bell within reach, patient instructed to call if assistance as needed. Patient prep in: [address], [place] [address].    Patient family has pager # n/a  Family in: hospital.   Prep by: Marce Rahman

## 2017-11-16 NOTE — PROGRESS NOTES
Bedside shift change report given to Anila Ross RN (oncoming nurse) by Yasmeen Mayo RN (offgoing nurse). Report included the following information SBAR, Intake/Output and MAR.

## 2017-11-17 VITALS
RESPIRATION RATE: 18 BRPM | HEIGHT: 63 IN | BODY MASS INDEX: 27.23 KG/M2 | OXYGEN SATURATION: 99 % | SYSTOLIC BLOOD PRESSURE: 146 MMHG | WEIGHT: 153.66 LBS | TEMPERATURE: 97.5 F | HEART RATE: 68 BPM | DIASTOLIC BLOOD PRESSURE: 69 MMHG

## 2017-11-17 LAB
INR PPP: 2.6 (ref 0.9–1.1)
PROTHROMBIN TIME: 26.3 SEC (ref 9–11.1)

## 2017-11-17 PROCEDURE — 36415 COLL VENOUS BLD VENIPUNCTURE: CPT | Performed by: HOSPITALIST

## 2017-11-17 PROCEDURE — 99218 HC RM OBSERVATION: CPT

## 2017-11-17 PROCEDURE — 85610 PROTHROMBIN TIME: CPT | Performed by: HOSPITALIST

## 2017-11-17 PROCEDURE — 74011250637 HC RX REV CODE- 250/637: Performed by: SPECIALIST

## 2017-11-17 PROCEDURE — 74011250636 HC RX REV CODE- 250/636: Performed by: HOSPITALIST

## 2017-11-17 PROCEDURE — 74011250637 HC RX REV CODE- 250/637: Performed by: HOSPITALIST

## 2017-11-17 RX ORDER — IBUPROFEN 200 MG
14 TABLET ORAL EVERY 24 HOURS
Qty: 420 PATCH | Refills: 0 | Status: SHIPPED | OUTPATIENT
Start: 2017-11-17 | End: 2017-12-04 | Stop reason: ALTCHOICE

## 2017-11-17 RX ORDER — HYDROCODONE BITARTRATE AND ACETAMINOPHEN 5; 325 MG/1; MG/1
1 TABLET ORAL
Qty: 10 TAB | Refills: 0 | Status: SHIPPED | OUTPATIENT
Start: 2017-11-17 | End: 2017-12-04 | Stop reason: ALTCHOICE

## 2017-11-17 RX ORDER — CEPHALEXIN 500 MG/1
500 CAPSULE ORAL EVERY 6 HOURS
Qty: 12 CAP | Refills: 0 | Status: SHIPPED | OUTPATIENT
Start: 2017-11-17 | End: 2017-12-04 | Stop reason: ALTCHOICE

## 2017-11-17 RX ORDER — WARFARIN 2 MG/1
4 TABLET ORAL ONCE
Status: COMPLETED | OUTPATIENT
Start: 2017-11-17 | End: 2017-11-17

## 2017-11-17 RX ADMIN — HYDROCODONE BITARTRATE AND ACETAMINOPHEN 1 TABLET: 5; 325 TABLET ORAL at 12:09

## 2017-11-17 RX ADMIN — SODIUM CHLORIDE 75 ML/HR: 900 INJECTION, SOLUTION INTRAVENOUS at 06:07

## 2017-11-17 RX ADMIN — CEPHALEXIN 500 MG: 500 CAPSULE ORAL at 12:08

## 2017-11-17 RX ADMIN — CEPHALEXIN 500 MG: 500 CAPSULE ORAL at 06:07

## 2017-11-17 RX ADMIN — HYDROCODONE BITARTRATE AND ACETAMINOPHEN 1 TABLET: 5; 325 TABLET ORAL at 06:07

## 2017-11-17 RX ADMIN — WARFARIN SODIUM 4 MG: 2 TABLET ORAL at 12:09

## 2017-11-17 NOTE — PROGRESS NOTES
Hospital follow-up PCP transitional care appointment has been scheduled with Dr. Angélica Restrepo on Tuesday, 11/21/17 at 10:30 a.m. Pending patient discharge.   Deborah Gambino, Care Management Specialist.

## 2017-11-17 NOTE — DISCHARGE INSTRUCTIONS
Cellulitis: Care Instructions  Your Care Instructions    Cellulitis is a skin infection. It often occurs after a break in the skin from a scrape, cut, bite, or puncture, or after a rash. The doctor has checked you carefully, but problems can develop later. If you notice any problems or new symptoms, get medical treatment right away. Follow-up care is a key part of your treatment and safety. Be sure to make and go to all appointments, and call your doctor if you are having problems. It's also a good idea to know your test results and keep a list of the medicines you take. How can you care for yourself at home? · Take your antibiotics as directed. Do not stop taking them just because you feel better. You need to take the full course of antibiotics. · Prop up the infected area on pillows to reduce pain and swelling. Try to keep the area above the level of your heart as often as you can. · If your doctor told you how to care for your wound, follow your doctor's instructions. If you did not get instructions, follow this general advice:  ¨ Wash the wound with clean water 2 times a day. Don't use hydrogen peroxide or alcohol, which can slow healing. ¨ You may cover the wound with a thin layer of petroleum jelly, such as Vaseline, and a nonstick bandage. ¨ Apply more petroleum jelly and replace the bandage as needed. · Be safe with medicines. Take pain medicines exactly as directed. ¨ If the doctor gave you a prescription medicine for pain, take it as prescribed. ¨ If you are not taking a prescription pain medicine, ask your doctor if you can take an over-the-counter medicine. To prevent cellulitis in the future  · Try to prevent cuts, scrapes, or other injuries to your skin. Cellulitis most often occurs where there is a break in the skin. · If you get a scrape, cut, mild burn, or bite, wash the wound with clean water as soon as you can to help avoid infection.  Don't use hydrogen peroxide or alcohol, which can slow healing. · If you have swelling in your legs (edema), support stockings and good skin care may help prevent leg sores and cellulitis. · Take care of your feet, especially if you have diabetes or other conditions that increase the risk of infection. Wear shoes and socks. Do not go barefoot. If you have athlete's foot or other skin problems on your feet, talk to your doctor about how to treat them. When should you call for help? Call your doctor now or seek immediate medical care if:  ? · You have signs that your infection is getting worse, such as:  ¨ Increased pain, swelling, warmth, or redness. ¨ Red streaks leading from the area. ¨ Pus draining from the area. ¨ A fever. ? · You get a rash. ? Watch closely for changes in your health, and be sure to contact your doctor if:  ? · You are not getting better after 1 day (24 hours). ? · You do not get better as expected. Where can you learn more? Go to http://liya-valerio.info/. [address] in the search box to learn more about \"Cellulitis: Care Instructions. \"  Current as of: October 13, 2016  Content Version: 11.4  © 2152-9026 [employer]. Care instructions adapted under license by [employer] (which disclaims liability or warranty for this information). If you have questions about a medical condition or this instruction, always ask your healthcare professional. [place] [address] any warranty or liability for your use of this information. Foot Pain: Care Instructions  Your Care Instructions  Foot injuries that cause pain and swelling are fairly common. Almost all sports or home repair projects can cause a misstep that ends up as foot pain. Normal wear and tear, especially as you get older, also can cause foot pain. Most minor foot injuries will heal on their own, and home treatment is usually all you need to do.  If you have a severe injury, you may need tests and treatment. Follow-up care is a key part of your treatment and safety. Be sure to make and go to all appointments, and call your doctor if you are having problems. It's also a good idea to know your test results and keep a list of the medicines you take. How can you care for yourself at home? · Take pain medicines exactly as directed. ¨ If the doctor gave you a prescription medicine for pain, take it as prescribed. ¨ If you are not taking a prescription pain medicine, ask your doctor if you can take an over-the-counter medicine. · Rest and protect your foot. Take a break from any activity that may cause pain. · Put ice or a cold pack on your foot for 10 to 20 minutes at a time. Put a thin cloth between the ice and your skin. · Prop up the sore foot on a pillow when you ice it or anytime you sit or lie down during the next 3 days. Try to keep it above the level of your heart. This will help reduce swelling. · Your doctor may recommend that you wrap your foot with an elastic bandage. Keep your foot wrapped for as long as your doctor advises. · If your doctor recommends crutches, use them as directed. · Wear roomy footwear. · As soon as pain and swelling end, begin gentle exercises of your foot. Your doctor can tell you which exercises will help. When should you call for help? Call 911 anytime you think you may need emergency care. For example, call if:  ? · Your foot turns pale, white, blue, or cold. ?Call your doctor now or seek immediate medical care if:  ? · You cannot move or stand on your foot. ? · Your foot looks twisted or out of its normal position. ? · Your foot is not stable when you step down. ? · You have signs of infection, such as:  ¨ Increased pain, swelling, warmth, or redness. ¨ Red streaks leading from the sore area. ¨ Pus draining from a place on your foot. ¨ A fever. ? · Your foot is numb or tingly. ? Watch closely for changes in your health, and be sure to contact your doctor if:  ? · You do not get better as expected. ? · You have bruises from an injury that last longer than 2 weeks. Where can you learn more? Go to http://liya-valerio.info/. Enter F754 in the search box to learn more about \"Foot Pain: Care Instructions. \"  Current as of: March 21, 2017  Content Version: 11.4  © 1791-3450 [employer] Care instructions adapted under license by [employer] (which disclaims liability or warranty for this information). If you have questions about a medical condition or this instruction, always ask your healthcare professional. [place] [address] any warranty or liability for your use of this information. DISCHARGE SUMMARY from Nurse    PATIENT INSTRUCTIONS:    After general anesthesia or intravenous sedation, for 24 hours or while taking prescription Narcotics:  · Limit your activities  · Do not drive and operate hazardous machinery  · Do not make important personal or business decisions  · Do  not drink alcoholic beverages  · If you have not urinated within 8 hours after discharge, please contact your surgeon on call. Report the following to your surgeon:  · Excessive pain, swelling, redness or odor of or around the surgical area  · Temperature over 100.5  · Nausea and vomiting lasting longer than 4 hours or if unable to take medications  · Any signs of decreased circulation or nerve impairment to extremity: change in color, persistent  numbness, tingling, coldness or increase pain  · Any questions    These are general instructions for a healthy lifestyle:    No smoking/ No tobacco products/ Avoid exposure to second hand smoke  Surgeon General's Warning:  Quitting smoking now greatly reduces serious risk to your health.     Obesity, smoking, and sedentary lifestyle greatly increases your risk for illness    A healthy diet, regular physical exercise & weight monitoring are important for maintaining a healthy lifestyle    You may be retaining fluid if you have a history of heart failure or if you experience any of the following symptoms:  Weight gain of 3 pounds or more overnight or 5 pounds in a week, increased swelling in our hands or feet or shortness of breath while lying flat in bed. Please call your doctor as soon as you notice any of these symptoms; do not wait until your next office visit. Recognize signs and symptoms of STROKE:    F-face looks uneven    A-arms unable to move or move unevenly    S-speech slurred or non-existent    T-time-call 911 as soon as signs and symptoms begin-DO NOT go       Back to bed or wait to see if you get better-TIME IS BRAIN. Warning Signs of HEART ATTACK     Call 911 if you have these symptoms:   Chest discomfort. Most heart attacks involve discomfort in the center of the chest that lasts more than a few minutes, or that goes away and comes back. It can feel like uncomfortable pressure, squeezing, fullness, or pain.  Discomfort in other areas of the upper body. Symptoms can include pain or discomfort in one or both arms, the back, neck, jaw, or stomach.  Shortness of breath with or without chest discomfort.  Other signs may include breaking out in a cold sweat, nausea, or lightheadedness. Don't wait more than five minutes to call 911 - MINUTES MATTER! Fast action can save your life. Calling 911 is almost always the fastest way to get lifesaving treatment. Emergency Medical Services staff can begin treatment when they arrive -- up to an hour sooner than if someone gets to the hospital by car. The discharge information has been reviewed with the patient. The patient verbalized understanding. Discharge medications reviewed with the patient and appropriate educational materials and side effects teaching were provided.       ___________________________________________________________________________________________________________________________________  Smoking Cessation Program: This is a free, phone/text/email based, smoking cessation program. The program is individualized to meet each patient's needs. To enroll use the link https://ha.Eventmag.ru/ra/survey/7030 or text [employer] to 323 0628 from any smart phone.

## 2017-11-17 NOTE — PROGRESS NOTES
Pharmacist Note  Warfarin Dosing  Consult provided for this 52 y. o.female to manage warfarin for Mechanical Heart Valve    INR Goal: 2.5- 3.5 (mechanical heart valve)    Home regimen/ tablet size: 5 mg on Wednesday and Saturday                                                 4 mg on Sunday, Monday, Fabio Sovereign and Friday. Drugs that may increase INR: None  Drugs that may decrease INR: None  Other current anticoagulants/ drugs that may increase bleeding risk: None  Risk factors: None  Daily INR ordered: YES    Recent Labs      11/17/17   0415  11/16/17   0439  11/15/17   0335  11/14/17   1430   HGB   --    --   13.8  15.0   INR  2.6*  3.1*  3.3*  2.6*     Date               INR                  Dose  11/14  2.6  5 mg  11/15  3.3  4 mg  11/16               3.1                  4 mg  11/17               2.6                  4 mg                                                                               Assessment/ Plan:  Warfarin 4 mg PO x 1 dose. Pharmacy will continue to monitor daily and adjust therapy as indicated. Please contact the pharmacist at   for outpatient recommendations if needed.
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
## 2017-11-17 NOTE — PROGRESS NOTES
1300 Patient educated with discharge instructions and Rx. Patient verbalized understanding with adequate teach back. Patient signed copy of the discharge instructions that were then placed on the hard chart.

## 2017-11-17 NOTE — PROGRESS NOTES
Bedside shift change report given to Corinne, RN (oncoming nurse) by Aston Baldwin RN (offgoing nurse). Report included the following information SBAR, Kardex, Intake/Output and Recent Results.

## 2017-11-17 NOTE — PROGRESS NOTES
Bedside shift change report given to YASMANI Mandujano (oncoming nurse) by Aysha Cruz (offgoing nurse). Report included the following information SBAR, Kardex, Intake/Output and MAR.

## 2017-11-17 NOTE — PROGRESS NOTES
Cardiology Progress Note            Admit Date: 11/14/2017  Admit Diagnosis: Cellulitis  Date: 11/17/2017     Time: 9:13 AM     Assessment and Plan     1. Chest pain- atypical, worse with deep inspiration. trop, ekg negative  -TTE NL EF, NWMA  -No further testing needed-     2. John. Dorsal feet erythema:  - now with residual skin discoloration, no eythema  -On po antibiotics per primary team  -Uric acid level NL  -BC NGTD    3. Hx of AVR now moderate AI, perivalvular leak. (noted on SARAH BETH 11/17)  -Non-mobile echogenic focus noted at site of leak but negative blood cultures. Dr. Violette Raphael following pt and will follow in office. Unlikely vegetation since blood cultures NGTD but will be followed by Dr. Odell May and Dr. Violette Raphael. 4. Tobacco abuse- again encouraged cessation. 5. Chronic anticoagulation due to AVR. INR 2.6. Pharmacy managing coumadin> Follows with [facility] outpatient. Pt with perivalvular leak, moderate AI- asymptomatic. No further testing or intervention at this time. Blood cultures remain NGTD so unlikely vegetation. Dr. Violette Raphael and Leslee Mirza will follow in office. BLE dorsal feet erythema resolved. [id number] Joy Soriano from cardiology perspective for pt to be discharged. Cardiology attending: seen and examined. Agree with assess and plan  Talked with dr Violette Raphael and echo reviewed. No need for intervention at this point. Will see in office for usual follow up in December. Future Appointments  Date Time Provider Meg uQinteros   11/20/2017 9:40 AM [facility]   12/4/2017 8:40 AM Azam Rock  [address]         Subjective:   [place] River with resolution of BLE erythema, now just cutaneous skin discoloration. No SOB.     Objective:      Physical Exam:                Visit Vitals    /69 (BP 1 Location: Left arm, BP Patient Position: At rest)    Pulse 68  Temp 97.5 °F (36.4 °C)    Resp 18    Ht 5' 3\" (1.6 m)    Wt 69.7 kg (153 lb 10.6 oz)    SpO2 99%    BMI 27.22 kg/m2          General Appearance:   Well developed, alert and oriented x 3, and   individual in no acute distress. Ears/Nose/Mouth/Throat:    Hearing grossly normal.         Neck:  Supple. Chest:    Lungs clear to auscultation bilaterally. Cardiovascular:    Regular rate and rhythm, S1, S2 normal, + mechanical click, grade II/VI systolic murmur heard best at RUSB   Abdomen:    Non-distended. Extremities:  BLE dorsal foot skin discoloration. Skin:  Warm and dry.      Telemetry:           Data Review:    Labs:    Recent Results (from the past 24 hour(s))   PROTHROMBIN TIME + INR    Collection Time: 11/17/17  4:15 AM   Result Value Ref Range    INR 2.6 (H) 0.9 - 1.1      Prothrombin time 26.3 (H) 9.0 - 11.1 sec          Radiology:        Current Facility-Administered Medications   Medication Dose Route Frequency    acetaminophen (TYLENOL) tablet 650 mg  650 mg Oral Q6H PRN    HYDROcodone-acetaminophen (NORCO) 5-325 mg per tablet 1 Tab  1 Tab Oral Q4H PRN    nicotine (NICODERM CQ) 14 mg/24 hr patch 1 Patch  1 Patch TransDERmal Q24H    pravastatin (PRAVACHOL) tablet 40 mg  40 mg Oral QHS    venlafaxine-SR (EFFEXOR-XR) capsule 75 mg  75 mg Oral DAILY    0.9% sodium chloride infusion  75 mL/hr IntraVENous CONTINUOUS    sodium chloride (NS) flush 5-10 mL  5-10 mL IntraVENous Q8H    sodium chloride (NS) flush 5-10 mL  5-10 mL IntraVENous PRN    cephALEXin (KEFLEX) capsule 500 mg  500 mg Oral Q6H    Warfarin pharmacy to dose   Other Rx Dosing/Monitoring     Tali Desir MD          [facility] [address] [facility] [address] [facility] [address]   [facility]   [phone]

## 2017-11-20 ENCOUNTER — CLINICAL SUPPORT (OUTPATIENT)
Dept: CARDIOLOGY CLINIC | Age: 47
End: 2017-11-20

## 2017-11-20 ENCOUNTER — PATIENT OUTREACH (OUTPATIENT)
Dept: FAMILY MEDICINE CLINIC | Age: 47
End: 2017-11-20

## 2017-11-20 DIAGNOSIS — Z79.01 LONG-TERM (CURRENT) USE OF ANTICOAGULANTS: Primary | ICD-10-CM

## 2017-11-20 DIAGNOSIS — Z95.2 S/P AVR (AORTIC VALVE REPLACEMENT): ICD-10-CM

## 2017-11-20 LAB
BACTERIA SPEC CULT: NORMAL
INR BLD: NORMAL
PT POC: NORMAL SECONDS
SERVICE CMNT-IMP: NORMAL
VALID INTERNAL CONTROL?: YES

## 2017-11-20 NOTE — PROGRESS NOTES
A full discussion of the nature of anticoagulants has been carried out. A benefit risk analysis has been presented to the patient, so that they understand the justification for choosing anticoagulation at this time. The need for frequent and regular monitoring, precise dosage adjustment and compliance is stressed. Side effects of potential bleeding are discussed. The patient should avoid any OTC items containing aspirin or ibuprofen, and should avoid great swings in general diet. Avoid alcohol consumption. Call if any signs of abnormal bleeding. Next PT/INR test in 1 week; telephone follow up thereafter. Per Dr Ekaterina Cavazos patient may get the flu shot. Patient verbalized understanding.

## 2017-11-20 NOTE — PROGRESS NOTES
Kun Luther is a 52 y.o. female   This patient was received as a referral from [facility] for an admission to [facility] on 11/14/17-11/17/17 for cellulitis. Outbound call to patient today, who stated she is doing better, was able to be verified by 3 identifiers. Patient was admitted for cellulitis. Patient states, \"I had a valve done last year, and they are saying it could be causing the cellulitis. Inpatient RRAT Score: 1  Patient's challenges to self management identified: denies challenges. independent of care. Medication Management: good understanding, good adherence. Summary of patients top three problems:     Problem 1: Cellulitis-history of bicuspid aortic valve s/p mechanical AVR on coumadin, s/p CABG, who presents with right foot pain. The pain is on the dorsum of the right foot, began 3 days ago, burning in nature, without known exacerbating. Treated with keflex and improved. Unlikely gout given the location, normal uric acid level. Blood cultures negative.        Problem 2: AVR, mechanical valve, on coumadin     Problem 3: Smoking- Nicotine Patch, continues q24 hrs. Patients motivational level on a scale of 0-10: 9  Advance Care Planning:   Patient was offered the opportunity to discuss advance care planning:  no     Does patient have an Advance Directive:  no   If no, did you provide information on Advance Care Planning? No, not discussed during this assessment     [employer], Referrals, and Durable Medical Equipment: none    Follow up appointments:  Future Appointments      Provider Meg Quinteros   11/21/2017 10:30 AM Jony Jean MD [address]   11/27/2017 8:40 AM [facility]UL[facility]EN [facility] SILVIANO SCHED   12/4/2017 8:40 AM Raimundo Edward MD [facility]      .   Current Outpatient Prescriptions   Medication Sig    HYDROcodone-acetaminophen (NORCO) 5-325 mg per tablet Take 1 Tab by mouth every four (4) hours as needed. Max Daily Amount: 6 Tabs.  nicotine (NICODERM CQ) 14 mg/24 hr patch 14 Patches by TransDERmal route every twenty-four (24) hours for 30 days.  cephALEXin (KEFLEX) 500 mg capsule Take 1 Cap by mouth every six (6) hours.  warfarin (COUMADIN) 1 mg tablet Take 4 mg by mouth every Sunday, Monday, Thursday.  warfarin (COUMADIN) 1 mg tablet Take 4 mg by mouth every Tuesday and Friday.  warfarin (COUMADIN) 1 mg tablet Take 5 mg by mouth two (2) times a week on Wednesday and Saturday.  pravastatin (PRAVACHOL) 40 mg tablet Take 1 Tab by mouth nightly.  venlafaxine-SR (EFFEXOR-XR) 75 mg capsule Take 1 Cap by mouth daily.  cetirizine (ZYRTEC) 10 mg tablet Take 10 mg by mouth as needed. No current facility-administered medications for this visit. Patient verbalized understanding of all information discussed. Patient has this Nurse Navigators contact information for any further questions, concerns, or needs.

## 2017-11-21 ENCOUNTER — OFFICE VISIT (OUTPATIENT)
Dept: FAMILY MEDICINE CLINIC | Age: 47
End: 2017-11-21

## 2017-11-21 VITALS
WEIGHT: 154.4 LBS | HEIGHT: 63 IN | RESPIRATION RATE: 18 BRPM | DIASTOLIC BLOOD PRESSURE: 63 MMHG | SYSTOLIC BLOOD PRESSURE: 111 MMHG | BODY MASS INDEX: 27.36 KG/M2 | HEART RATE: 63 BPM | OXYGEN SATURATION: 97 % | TEMPERATURE: 99.4 F

## 2017-11-21 DIAGNOSIS — M25.60 MORNING STIFFNESS OF JOINTS: ICD-10-CM

## 2017-11-21 DIAGNOSIS — Z95.2 S/P AVR (AORTIC VALVE REPLACEMENT): ICD-10-CM

## 2017-11-21 DIAGNOSIS — G89.29 CHRONIC RIGHT SHOULDER PAIN: ICD-10-CM

## 2017-11-21 DIAGNOSIS — M25.511 CHRONIC RIGHT SHOULDER PAIN: ICD-10-CM

## 2017-11-21 DIAGNOSIS — M79.672 FOOT PAIN, BILATERAL: Primary | ICD-10-CM

## 2017-11-21 DIAGNOSIS — M79.671 FOOT PAIN, BILATERAL: Primary | ICD-10-CM

## 2017-11-21 NOTE — PATIENT INSTRUCTIONS
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
## 2017-11-21 NOTE — PROGRESS NOTES
Chief Complaint   Patient presents with   [facility] Follow Up     [facility] Cellulitis of right foot adm on , d/c on      Declined flu vaccine today. Wants to wait until she is feeling better. Reviewed Record in preparation for visit and have obtained necessary documentation. Identified pt with two pt identifiers (Name @ )    Health Maintenance Due   Topic    Influenza Age 5 to Adult          1. Have you been to the ER, urgent care clinic since your last visit? Hospitalized since your last visit? See todays encounter. 2. Have you seen or consulted any other health care providers outside of the [address] [place] [address] since your last visit? Include any pap smears or colon screening.  No

## 2017-11-21 NOTE — PATIENT INSTRUCTIONS
My Chart Patient Assistance Desk  [phone]      Foot Pain: Care Instructions  Your Care Instructions  Foot injuries that cause pain and swelling are fairly common. Almost all sports or home repair projects can cause a misstep that ends up as foot pain. Normal wear and tear, especially as you get older, also can cause foot pain. Most minor foot injuries will heal on their own, and home treatment is usually all you need to do. If you have a severe injury, you may need tests and treatment. Follow-up care is a key part of your treatment and safety. Be sure to make and go to all appointments, and call your doctor if you are having problems. It's also a good idea to know your test results and keep a list of the medicines you take. How can you care for yourself at home? · Take pain medicines exactly as directed. ¨ If the doctor gave you a prescription medicine for pain, take it as prescribed. ¨ If you are not taking a prescription pain medicine, ask your doctor if you can take an over-the-counter medicine. · Rest and protect your foot. Take a break from any activity that may cause pain. · Put ice or a cold pack on your foot for 10 to 20 minutes at a time. Put a thin cloth between the ice and your skin. · Prop up the sore foot on a pillow when you ice it or anytime you sit or lie down during the next 3 days. Try to keep it above the level of your heart. This will help reduce swelling. · Your doctor may recommend that you wrap your foot with an elastic bandage. Keep your foot wrapped for as long as your doctor advises. · If your doctor recommends crutches, use them as directed. · Wear roomy footwear. · As soon as pain and swelling end, begin gentle exercises of your foot. Your doctor can tell you which exercises will help. When should you call for help? Call 911 anytime you think you may need emergency care. For example, call if:  ? · Your foot turns pale, white, blue, or cold.    ?Call your doctor now or seek immediate medical care if:  ? · You cannot move or stand on your foot. ? · Your foot looks twisted or out of its normal position. ? · Your foot is not stable when you step down. ? · You have signs of infection, such as:  ¨ Increased pain, swelling, warmth, or redness. ¨ Red streaks leading from the sore area. ¨ Pus draining from a place on your foot. ¨ A fever. ? · Your foot is numb or tingly. ? Watch closely for changes in your health, and be sure to contact your doctor if:  ? · You do not get better as expected. ? · You have bruises from an injury that last longer than 2 weeks. Where can you learn more? Go to http://liya-valerio.info/. Enter C346 in the search box to learn more about \"Foot Pain: Care Instructions. \"  Current as of: March 21, 2017  Content Version: 11.4  © 3016-9092 [employer]. Care instructions adapted under license by [employer] (which disclaims liability or warranty for this information). If you have questions about a medical condition or this instruction, always ask your healthcare professional. [place] [address] any warranty or liability for your use of this information.

## 2017-11-22 LAB
ANA SER QL: NEGATIVE
CRP SERPL-MCNC: 4.7 MG/L (ref 0–4.9)
ERYTHROCYTE [SEDIMENTATION RATE] IN BLOOD BY WESTERGREN METHOD: 28 MM/HR (ref 0–32)
URATE SERPL-MCNC: 4.3 MG/DL (ref 2.5–7.1)

## 2017-11-27 ENCOUNTER — CLINICAL SUPPORT (OUTPATIENT)
Dept: CARDIOLOGY CLINIC | Age: 47
End: 2017-11-27

## 2017-11-27 DIAGNOSIS — Z95.2 S/P AVR (AORTIC VALVE REPLACEMENT): ICD-10-CM

## 2017-11-27 DIAGNOSIS — Z79.01 LONG-TERM (CURRENT) USE OF ANTICOAGULANTS: Primary | ICD-10-CM

## 2017-11-27 LAB
INR BLD: NORMAL
INR, EXTERNAL: 2.7 (ref 2–3)
PT POC: NORMAL SECONDS
VALID INTERNAL CONTROL?: YES

## 2017-11-29 NOTE — PROGRESS NOTES
Sent in letter:  Labs did not suggest a rheumatologic problem. I could refer you to a rheumatologist for their opinion. We should certainly do that if you are not feeling better. I hope you are improving, though!  Just call or send a note in the portal if you want a referral!

## 2017-12-04 ENCOUNTER — OFFICE VISIT (OUTPATIENT)
Dept: CARDIOLOGY CLINIC | Age: 47
End: 2017-12-04

## 2017-12-04 ENCOUNTER — CLINICAL SUPPORT (OUTPATIENT)
Dept: CARDIOLOGY CLINIC | Age: 47
End: 2017-12-04

## 2017-12-04 VITALS
HEART RATE: 64 BPM | RESPIRATION RATE: 16 BRPM | BODY MASS INDEX: 27.04 KG/M2 | HEIGHT: 63 IN | WEIGHT: 152.6 LBS | DIASTOLIC BLOOD PRESSURE: 60 MMHG | SYSTOLIC BLOOD PRESSURE: 130 MMHG

## 2017-12-04 DIAGNOSIS — E78.00 HYPERCHOLESTEREMIA: ICD-10-CM

## 2017-12-04 DIAGNOSIS — I35.0 NONRHEUMATIC AORTIC VALVE STENOSIS: Primary | ICD-10-CM

## 2017-12-04 DIAGNOSIS — Z79.01 LONG-TERM (CURRENT) USE OF ANTICOAGULANTS: Primary | ICD-10-CM

## 2017-12-04 DIAGNOSIS — Z95.2 S/P AVR (AORTIC VALVE REPLACEMENT): ICD-10-CM

## 2017-12-04 LAB
INR BLD: NORMAL
INR, EXTERNAL: 3.2 (ref 2–3)
PT POC: NORMAL SECONDS
VALID INTERNAL CONTROL?: YES

## 2017-12-04 NOTE — MR AVS SNAPSHOT
Visit Information Date & Time Provider Department Dept. Phone Encounter #  
 12/4/2017  8:40 AM Shalom Albright MD [facility] [phone] [id number] Follow-up Instructions Return in about 6 months (around 6/4/2018). Your Appointments 12/18/2017  2:15 PM  
Follow Up with Sanjuana Wilson MD  
[facility] - [address] [facility] ([facility]) Appt Note: f/u renee Aguilar, seen 11/17, wanted to see her 1 month [address] [facility] [address] [facility]  
[address] [place] [address] [place] [phone] Upcoming Health Maintenance Date Due Influenza Age 5 to Adult 8/1/2017 PAP AKA CERVICAL CYTOLOGY 5/10/2018 DTaP/Tdap/Td series (2 - Td) 8/1/2023 Allergies as of 12/4/2017  Review Complete On: 12/4/2017 By: Kerri Grover Severity Noted Reaction Type Reactions Contrast Dye [Iodine] High 03/02/2010   Systemic Anaphylaxis Chantix [Varenicline]  08/22/2017   Intolerance Other (comments) Psych side effects Lipitor [Atorvastatin]  03/21/2014    Myalgia And GI upset Nicoderm Cq [Nicotine]  12/04/2017    Rash Percocet [Oxycodone-acetaminophen]  09/24/2015    Other (comments) Dizziness/fall Prozac [Fluoxetine]  02/21/2017    Other (comments) QT prolongation Sulfa (Sulfonamide Antibiotics)    Other (comments) Gi upset Tramadol  09/29/2016    Rash Vicoprofen [Hydrocodone-ibuprofen]  06/25/2014    Nausea and Vomiting Can take hydrocodone and ibuprofen separately, but not together Wellbutrin [Bupropion]  08/22/2017   Intolerance Other (comments)  
 tonny Current Immunizations  Reviewed on 8/9/2017 Name Date Influenza Vaccine 11/13/2015 Influenza Vaccine Brenda Crafts) 11/16/2016 Influenza Vaccine (Quad) PF 1/16/2017  1:10 PM  
 Influenza Vaccine Whole 11/23/2007 Pneumococcal Vaccine (Unspecified Type) 11/4/2009 Tdap 8/1/2013 Not reviewed this visit You Were Diagnosed With   
  
 Codes Comments Nonrheumatic aortic valve stenosis    -  Primary ICD-10-CM: I35.0 ICD-9-CM: 424.1 Hypercholesteremia     ICD-10-CM: E78.00 ICD-9-CM: 272.0 Vitals BP Pulse Resp Height(growth percentile) Weight(growth percentile) BMI  
 130/60 (BP 1 Location: Right arm, BP Patient Position: Sitting) 64 16 5' 3\" (1.6 m) 152 lb 9.6 oz (69.2 kg) 27.03 kg/m2 OB Status Smoking Status Hysterectomy Current Every Day Smoker Vitals History BMI and BSA Data Body Mass Index Body Surface Area  
 27.03 kg/m 2 1.75 m 2 Preferred Pharmacy Pharmacy Name Phone [facility]  HOWARD, Roger Thomas RDS. [phone] Your Updated Medication List  
  
   
This list is accurate as of: 12/4/17  8:47 AM.  Always use your most recent med list.  
  
  
  
  
 cephALEXin 500 mg capsule Commonly known as:  Dellia Cons Take 1 Cap by mouth every six (6) hours. * COUMADIN 1 mg tablet Generic drug:  warfarin Take 4 mg by mouth every Sunday, Monday, Thursday. * COUMADIN 1 mg tablet Generic drug:  warfarin Take 4 mg by mouth every Tuesday and Friday. * COUMADIN 1 mg tablet Generic drug:  warfarin Take 5 mg by mouth two (2) times a week on Wednesday and Saturday. HYDROcodone-acetaminophen 5-325 mg per tablet Commonly known as:  Joanne Pichardo Take 1 Tab by mouth every four (4) hours as needed. Max Daily Amount: 6 Tabs. nicotine 14 mg/24 hr patch Commonly known as:  NICODERM CQ  
14 Patches by TransDERmal route every twenty-four (24) hours for 30 days. pravastatin 40 mg tablet Commonly known as:  PRAVACHOL Take 1 Tab by mouth nightly. venlafaxine-SR 75 mg capsule Commonly known as:  EFFEXOR-XR Take 1 Cap by mouth daily. ZyrTEC 10 mg tablet Generic drug:  cetirizine Take 10 mg by mouth as needed. * Notice: This list has 3 medication(s) that are the same as other medications prescribed for you. Read the directions carefully, and ask your doctor or other care provider to review them with you. Follow-up Instructions Return in about 6 months (around 6/4/2018). Introducing [facility]! Dear Aalnis Dutton: 
Thank you for requesting a [facility] account. Our records indicate that you already have an active [facility] account. You can access your account anytime at [email or web address] Did you know that you can access your hospital and ER discharge instructions at any time in [facility]? You can also review all of your test results from your hospital stay or ER visit. Additional Information If you have questions, please visit the Frequently Asked Questions section of the [facility] website at [email or web address] Remember, [facility] is NOT to be used for urgent needs. For medical emergencies, dial 911. Now available from your iPhone and Android! Please provide this summary of care documentation to your next provider. Your primary care clinician is listed as Jean Claude English. If you have any questions after today's visit, please call [phone].

## 2017-12-04 NOTE — PROGRESS NOTES
A full discussion of the nature of anticoagulants has been carried out. A benefit risk analysis has been presented to the patient, so that they understand the justification for choosing anticoagulation at this time. The need for frequent and regular monitoring, precise dosage adjustment and compliance is stressed. Side effects of potential bleeding are discussed. The patient should avoid any OTC items containing aspirin or ibuprofen, and should avoid great swings in general diet. Avoid alcohol consumption. Call if any signs of abnormal bleeding. Next PT/INR test in  1 month. No change in dosing. Office visit with Dr. Fatemeh Saldivar today.
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
## 2017-12-18 ENCOUNTER — OFFICE VISIT (OUTPATIENT)
Dept: CARDIOTHORACIC SURGERY | Age: 47
End: 2017-12-18

## 2017-12-18 VITALS
TEMPERATURE: 98.9 F | HEIGHT: 64 IN | HEART RATE: 69 BPM | RESPIRATION RATE: 16 BRPM | OXYGEN SATURATION: 98 % | DIASTOLIC BLOOD PRESSURE: 66 MMHG | WEIGHT: 156 LBS | BODY MASS INDEX: 26.63 KG/M2 | SYSTOLIC BLOOD PRESSURE: 92 MMHG

## 2017-12-18 DIAGNOSIS — Z95.1 S/P CABG X 1: ICD-10-CM

## 2017-12-18 DIAGNOSIS — Z95.2 S/P AVR (AORTIC VALVE REPLACEMENT): Primary | ICD-10-CM
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
## 2017-12-18 NOTE — PROGRESS NOTES
Pt seen in follow up from recent hospitalization and echo reviewed  She feels well  She has a small perivalvular leak near patch aortoplasty   No symx and no evident hemolysis   Follow for now  She needs to go to [facility] because of insurance reasons  Instructions given

## 2017-12-29 ENCOUNTER — CLINICAL SUPPORT (OUTPATIENT)
Dept: CARDIOLOGY CLINIC | Age: 47
End: 2017-12-29

## 2017-12-29 DIAGNOSIS — Z95.2 S/P AVR (AORTIC VALVE REPLACEMENT): ICD-10-CM

## 2017-12-29 DIAGNOSIS — Z79.01 LONG-TERM (CURRENT) USE OF ANTICOAGULANTS: Primary | ICD-10-CM

## 2017-12-29 LAB
INR BLD: NORMAL
INR, EXTERNAL: 3.1 (ref 2–3)
PT POC: NORMAL SECONDS
VALID INTERNAL CONTROL?: YES

## 2017-12-30 NOTE — PROGRESS NOTES
A full discussion of the nature of anticoagulants has been carried out. A benefit risk analysis has been presented to the patient, so that they understand the justification for choosing anticoagulation at this time. The need for frequent and regular monitoring, precise dosage adjustment and compliance is stressed. Side effects of potential bleeding are discussed. The patient should avoid any OTC items containing aspirin or ibuprofen, and should avoid great swings in general diet. Avoid alcohol consumption. Call if any signs of abnormal bleeding. Patient to transfer to another cardiology group due to insurance. Patient aware of the need for routine monitoring of INR. To call office and schedule INR check if she finds that [facility] will take insurance.

## 2019-01-03 NOTE — PROGRESS NOTES
Chief Complaint   Patient presents with    Cough     yellow green mucus, little blood tinged, congestion , ears clogged , wheezing, exposed to pneumonia, wants referral to her ENT        Reviewed Record in preparation for visit and have obtained necessary documentation. Identified pt with two pt identifiers (Name @ )    Health Maintenance Due   Topic    Pneumococcal 19-64 Medium Risk (1 of 1 - PPSV23)    DTaP/Tdap/Td series (1 - Tdap)    PAP AKA CERVICAL CYTOLOGY          1. Have you been to the ER, urgent care clinic since your last visit? Hospitalized since your last visit? No    2. Have you seen or consulted any other health care providers outside of the [address] [place] [address] since your last visit? Include any pap smears or colon screening.  No Discharged

## 2021-06-05 NOTE — MR AVS SNAPSHOT
Visit Information Date & Time Provider Department Dept. Phone Encounter #  
 11/21/2017 10:30 AM Sylvia Redd, [address] [phone] [id number] Follow-up Instructions Return in about 2 months (around 1/21/2018) for Follow up. Your Appointments 11/27/2017  8:40 AM  
[facility] with [facility] ([facility] SCHEDULING) Appt Note: 1 week [address] [place] [address]  
[address] [place]  
  
    
 12/4/2017  8:40 AM  
ESTABLISHED PATIENT with Suze Churchill MD  
[facility] ([facility]) Appt Note: 6 mo f/u  
 [address] [place] [address]  
[address] [id number] Upcoming Health Maintenance Date Due Influenza Age 5 to Adult 8/1/2017 PAP AKA CERVICAL CYTOLOGY 5/10/2018 DTaP/Tdap/Td series (2 - Td) 8/1/2023 Allergies as of 11/21/2017  Review Complete On: 11/21/2017 By: Sylvia Redd MD  
  
 Severity Noted Reaction Type Reactions Contrast Dye [Iodine] High 03/02/2010   Systemic Anaphylaxis Chantix [Varenicline]  08/22/2017   Intolerance Other (comments) Psych side effects Lipitor [Atorvastatin]  03/21/2014    Myalgia And GI upset Percocet [Oxycodone-acetaminophen]  09/24/2015    Other (comments) Dizziness/fall Prozac [Fluoxetine]  02/21/2017    Other (comments) QT prolongation Sulfa (Sulfonamide Antibiotics)    Other (comments) Gi upset Tramadol  09/29/2016    Rash Vicoprofen [Hydrocodone-ibuprofen]  06/25/2014    Nausea and Vomiting Can take hydrocodone and ibuprofen separately, but not together Wellbutrin [Bupropion]  08/22/2017   Intolerance Other (comments)  
 tonny Current Immunizations  Reviewed on 8/9/2017 Name Date Influenza Vaccine 11/13/2015 Influenza Vaccine [place]) 11/16/2016 Influenza Vaccine (Quad) PF 1/16/2017  1:10 PM  
 Influenza Vaccine Whole 11/23/2007 Pneumococcal Vaccine (Unspecified Type) 11/4/2009 Tdap 8/1/2013 Not reviewed this visit You Were Diagnosed With   
  
 Codes Comments Foot pain, bilateral    -  Primary ICD-10-CM: M79.671, G93.332 ICD-9-CM: 729.5 S/P AVR (aortic valve replacement)     ICD-10-CM: O09.2 ICD-9-CM: V43.3 Chronic right shoulder pain     ICD-10-CM: M25.511, G89.29 ICD-9-CM: 719.41, 338.29 Morning stiffness of joints     ICD-10-CM: M25.60 ICD-9-CM: 719.50 Vitals BP Pulse Temp Resp Height(growth percentile) Weight(growth percentile) 111/63 (BP 1 Location: Left arm, BP Patient Position: Sitting) 63 99.4 °F (37.4 °C) (Oral) 18 5' 3\" (1.6 m) 154 lb 6.4 oz (70 kg) SpO2 BMI OB Status Smoking Status 97% 27.35 kg/m2 Hysterectomy Current Every Day Smoker Vitals History BMI and BSA Data Body Mass Index Body Surface Area  
 27.35 kg/m 2 1.76 m 2 Preferred Pharmacy Pharmacy Name Phone Stephanie Gonzales [address] [place] [address], [address] [place] [address] [facility]. [phone] Your Updated Medication List  
  
   
This list is accurate as of: 11/21/17 11:11 AM.  Always use your most recent med list.  
  
  
  
  
 cephALEXin 500 mg capsule Commonly known as:  Micah Moser Take 1 Cap by mouth every six (6) hours. * COUMADIN 1 mg tablet Generic drug:  warfarin Take 4 mg by mouth every Sunday, Monday, Thursday. * COUMADIN 1 mg tablet Generic drug:  warfarin Take 4 mg by mouth every Tuesday and Friday. * COUMADIN 1 mg tablet Generic drug:  warfarin Take 5 mg by mouth two (2) times a week on Wednesday and Saturday. HYDROcodone-acetaminophen 5-325 mg per tablet Commonly known as:  Phan Herrera Take 1 Tab by mouth every four (4) hours as needed.  Max Daily Amount: 6 Tabs.  
  
 nicotine 14 mg/24 hr patch Commonly known as:  NICODERM CQ  
14 Patches by TransDERmal route every twenty-four (24) hours for 30 days. pravastatin 40 mg tablet Commonly known as:  PRAVACHOL Take 1 Tab by mouth nightly. venlafaxine-SR 75 mg capsule Commonly known as:  EFFEXOR-XR Take 1 Cap by mouth daily. ZyrTEC 10 mg tablet Generic drug:  cetirizine Take 10 mg by mouth as needed. * Notice: This list has 3 medication(s) that are the same as other medications prescribed for you. Read the directions carefully, and ask your doctor or other care provider to review them with you. We Performed the Following BABAR W/REFLEX [48571 CPT(R)] C REACTIVE PROTEIN, QT [00033 CPT(R)] RHEUMATOID FACTOR, QL [id number] CPT(R)] SED RATE (ESR) [id number] CPT(R)] URIC ACID [id number] CPT(R)] Follow-up Instructions Return in about 2 months (around 1/21/2018) for Follow up. Patient Instructions Foot Pain: Care Instructions Your Care Instructions Foot injuries that cause pain and swelling are fairly common. Almost all sports or home repair projects can cause a misstep that ends up as foot pain. Normal wear and tear, especially as you get older, also can cause foot pain. Most minor foot injuries will heal on their own, and home treatment is usually all you need to do. If you have a severe injury, you may need tests and treatment. Follow-up care is a key part of your treatment and safety. Be sure to make and go to all appointments, and call your doctor if you are having problems. It's also a good idea to know your test results and keep a list of the medicines you take. How can you care for yourself at home? · Take pain medicines exactly as directed. ¨ If the doctor gave you a prescription medicine for pain, take it as prescribed.  
¨ If you are not taking a prescription pain medicine, ask your doctor if you can take an over-the-counter medicine. · Rest and protect your foot. Take a break from any activity that may cause pain. · Put ice or a cold pack on your foot for 10 to 20 minutes at a time. Put a thin cloth between the ice and your skin. · Prop up the sore foot on a pillow when you ice it or anytime you sit or lie down during the next 3 days. Try to keep it above the level of your heart. This will help reduce swelling. · Your doctor may recommend that you wrap your foot with an elastic bandage. Keep your foot wrapped for as long as your doctor advises. · If your doctor recommends crutches, use them as directed. · Wear roomy footwear. · As soon as pain and swelling end, begin gentle exercises of your foot. Your doctor can tell you which exercises will help. When should you call for help? Call 911 anytime you think you may need emergency care. For example, call if: 
? · Your foot turns pale, white, blue, or cold. ?Call your doctor now or seek immediate medical care if: 
? · You cannot move or stand on your foot. ? · Your foot looks twisted or out of its normal position. ? · Your foot is not stable when you step down. ? · You have signs of infection, such as: 
¨ Increased pain, swelling, warmth, or redness. ¨ Red streaks leading from the sore area. ¨ Pus draining from a place on your foot. ¨ A fever. ? · Your foot is numb or tingly. ? Watch closely for changes in your health, and be sure to contact your doctor if: 
? · You do not get better as expected. ? · You have bruises from an injury that last longer than 2 weeks. Where can you learn more? Go to http://liya-valerio.info/. Enter P405 in the search box to learn more about \"Foot Pain: Care Instructions. \" Current as of: March 21, 2017 Content Version: 11.4 © 0669-1746 [employer].  Care instructions adapted under license by [employer] (NIH) (which disclaims liability or warranty for this information). If you have questions about a medical condition or this instruction, always ask your healthcare professional. [address] any warranty or liability for your use of this information. Introducing [facility]! Dear Carleen Pagan: 
Thank you for requesting a [employer] account. Our records indicate that you have previously registered for a [employer] account but its currently inactive. Please call our [employer] support line at [phone]. Additional Information If you have questions, please visit the Frequently Asked Questions section of the [employer] website at [email or web address] Remember, [employer] is NOT to be used for urgent needs. For medical emergencies, dial 911. Now available from your iPhone and Android! Please provide this summary of care documentation to your next provider. Your primary care clinician is listed as Korina Burris. If you have any questions after today's visit, please call [phone]. yes

## 2024-02-14 NOTE — PROGRESS NOTES
Chief Complaint   Patient presents with    Follow-up     f/u long Q-T keturah       Reviewed Record in preparation for visit and have obtained necessary documentation. Identified pt with two pt identifiers (Name @ )    Health Maintenance Due   Topic    DTaP/Tdap/Td series (1 - Tdap)    INFLUENZA AGE 9 TO ADULT          1. Have you been to the ER, urgent care clinic since your last visit? Hospitalized since your last visit? No    2. Have you seen or consulted any other health care providers outside of the [address] [place] [address] since your last visit? Include any pap smears or colon screening.  No Pt watching TV  no complaints  VSS

## 2024-09-09 NOTE — ED NOTES
Future Appointments   Date Time Provider Department Center   11/8/2024 12:30 PM [facility]   12/9/2024 10:00 AM [facility] 3 [facility]   12/9/2024 10:30 AM [facility]   7/25/2025  8:30 AM Loni Jaquez MD [facility]       Report given to [address] [facility] from Monica Worrell.